# Patient Record
Sex: FEMALE | Race: BLACK OR AFRICAN AMERICAN | NOT HISPANIC OR LATINO | Employment: FULL TIME | URBAN - METROPOLITAN AREA
[De-identification: names, ages, dates, MRNs, and addresses within clinical notes are randomized per-mention and may not be internally consistent; named-entity substitution may affect disease eponyms.]

---

## 2023-03-09 DIAGNOSIS — I10 HYPERTENSION, UNSPECIFIED TYPE: Primary | ICD-10-CM

## 2023-03-09 DIAGNOSIS — E11.9 TYPE 2 DIABETES MELLITUS WITHOUT COMPLICATION, WITH LONG-TERM CURRENT USE OF INSULIN (MULTI): ICD-10-CM

## 2023-03-09 DIAGNOSIS — Z79.4 TYPE 2 DIABETES MELLITUS WITHOUT COMPLICATION, WITH LONG-TERM CURRENT USE OF INSULIN (MULTI): ICD-10-CM

## 2023-03-09 DIAGNOSIS — E78.5 HYPERLIPIDEMIA, UNSPECIFIED HYPERLIPIDEMIA TYPE: ICD-10-CM

## 2023-03-09 PROBLEM — K80.20 BILIARY STONE: Status: ACTIVE | Noted: 2023-03-09

## 2023-03-09 PROBLEM — K86.9 PANCREATIC DISEASE (HHS-HCC): Status: ACTIVE | Noted: 2023-03-09

## 2023-03-09 PROBLEM — R17 ELEVATED BILIRUBIN: Status: ACTIVE | Noted: 2023-03-09

## 2023-03-09 PROBLEM — R19.5 STOOL DISCOLORATION: Status: ACTIVE | Noted: 2023-03-09

## 2023-03-09 PROBLEM — R73.03 PREDIABETES: Status: ACTIVE | Noted: 2023-03-09

## 2023-03-09 PROBLEM — K30 INDIGESTION: Status: ACTIVE | Noted: 2023-03-09

## 2023-03-09 PROBLEM — R31.9 HEMATURIA: Status: ACTIVE | Noted: 2023-03-09

## 2023-03-09 PROBLEM — M16.9 OSTEOARTHRITIS OF HIP: Status: ACTIVE | Noted: 2023-03-09

## 2023-03-09 PROBLEM — M17.9 DJD (DEGENERATIVE JOINT DISEASE) OF KNEE: Status: ACTIVE | Noted: 2023-03-09

## 2023-03-09 PROBLEM — M19.049 OSTEOARTHRITIS OF FINGER: Status: ACTIVE | Noted: 2023-03-09

## 2023-03-09 RX ORDER — AMLODIPINE AND VALSARTAN 5; 160 MG/1; MG/1
1 TABLET ORAL DAILY
COMMUNITY
Start: 2022-02-25 | End: 2023-03-09 | Stop reason: SDUPTHER

## 2023-03-09 RX ORDER — METHYLPREDNISOLONE 4 MG/1
TABLET ORAL
COMMUNITY
Start: 2022-11-01 | End: 2023-06-22 | Stop reason: ALTCHOICE

## 2023-03-09 RX ORDER — ATORVASTATIN CALCIUM 20 MG/1
20 TABLET, FILM COATED ORAL DAILY
Qty: 90 TABLET | Refills: 0 | Status: SHIPPED | OUTPATIENT
Start: 2023-03-09 | End: 2024-01-02 | Stop reason: SDUPTHER

## 2023-03-09 RX ORDER — ALBUTEROL SULFATE 90 UG/1
2 AEROSOL, METERED RESPIRATORY (INHALATION) EVERY 6 HOURS PRN
COMMUNITY
Start: 2022-11-01 | End: 2023-12-29 | Stop reason: WASHOUT

## 2023-03-09 RX ORDER — DICLOFENAC SODIUM 30 MG/G
GEL TOPICAL
COMMUNITY
Start: 2022-02-25 | End: 2023-12-28 | Stop reason: ALTCHOICE

## 2023-03-09 RX ORDER — METFORMIN HYDROCHLORIDE 500 MG/1
500 TABLET, EXTENDED RELEASE ORAL DAILY
Qty: 90 TABLET | Refills: 0 | Status: SHIPPED | OUTPATIENT
Start: 2023-03-09 | End: 2024-01-02 | Stop reason: SDUPTHER

## 2023-03-09 RX ORDER — METFORMIN HYDROCHLORIDE 500 MG/1
1 TABLET, EXTENDED RELEASE ORAL DAILY
COMMUNITY
Start: 2021-10-06 | End: 2023-03-09 | Stop reason: SDUPTHER

## 2023-03-09 RX ORDER — ATORVASTATIN CALCIUM 20 MG/1
1 TABLET, FILM COATED ORAL DAILY
COMMUNITY
Start: 2022-02-25 | End: 2023-03-09 | Stop reason: SDUPTHER

## 2023-03-09 RX ORDER — AMLODIPINE AND VALSARTAN 5; 160 MG/1; MG/1
1 TABLET ORAL DAILY
Qty: 90 TABLET | Refills: 0 | Status: SHIPPED | OUTPATIENT
Start: 2023-03-09 | End: 2023-11-27 | Stop reason: SDUPTHER

## 2023-06-19 NOTE — PROGRESS NOTES
Subjective   Patient ID: Shagufta Crowley is a 62 y.o. female who presents for Sciatica (Sciatic pain ).  HPI   Sciatic  pain.  Since fall 5 years ago .  Hip  bursitis.   Had cortisone shots  would like  to go to ortho to  get the shots.   Oral meds not  helping.  Mortrin  naproxen salon pas patch  icey hot .      Review of Systems   Constitutional: Negative.    Musculoskeletal:  Positive for arthralgias, back pain and gait problem.   All other systems reviewed and are negative.      Objective   Physical Exam  Vitals and nursing note reviewed.   Constitutional:       Appearance: Normal appearance.   HENT:      Head: Normocephalic and atraumatic.      Nose: Nose normal.   Eyes:      Conjunctiva/sclera: Conjunctivae normal.   Cardiovascular:      Rate and Rhythm: Normal rate and regular rhythm.      Heart sounds: Normal heart sounds.   Pulmonary:      Effort: Pulmonary effort is normal.      Breath sounds: Normal breath sounds.   Musculoskeletal:         General: Tenderness present.   Skin:     General: Skin is dry.   Neurological:      General: No focal deficit present.      Mental Status: She is alert and oriented to person, place, and time. Mental status is at baseline.   Psychiatric:         Mood and Affect: Mood normal.         Behavior: Behavior normal.         Assessment/Plan   Problem List Items Addressed This Visit          Medium    DJD (degenerative joint disease) of knee    Relevant Medications    meloxicam (Mobic) 15 mg tablet    Osteoarthritis of hip - Primary    Relevant Medications    meloxicam (Mobic) 15 mg tablet    Other Relevant Orders    Referral to Orthopaedic Surgery    XR hip left 2 or 3 views    Elevated bilirubin    HLD (hyperlipidemia)    HTN (hypertension)    Indigestion    Pancreatic disease    Prediabetes     Other Visit Diagnoses       Screening for cardiovascular condition        Healthcare maintenance        Controlled type 2 diabetes mellitus without complication, unspecified  whether long term insulin use (CMS/[facility])        Encounter for screening mammogram for malignant neoplasm of breast        Relevant Orders    BI mammo bilateral screening tomosynthesis

## 2023-06-22 ENCOUNTER — OFFICE VISIT (OUTPATIENT)
Dept: PRIMARY CARE | Facility: CLINIC | Age: 63
End: 2023-06-22
Payer: COMMERCIAL

## 2023-06-22 VITALS
HEART RATE: 67 BPM | DIASTOLIC BLOOD PRESSURE: 84 MMHG | HEIGHT: 62 IN | OXYGEN SATURATION: 98 % | WEIGHT: 220.6 LBS | BODY MASS INDEX: 40.59 KG/M2 | SYSTOLIC BLOOD PRESSURE: 134 MMHG

## 2023-06-22 DIAGNOSIS — M17.9 OSTEOARTHRITIS OF KNEE, UNSPECIFIED LATERALITY, UNSPECIFIED OSTEOARTHRITIS TYPE: ICD-10-CM

## 2023-06-22 DIAGNOSIS — K86.9: ICD-10-CM

## 2023-06-22 DIAGNOSIS — R73.03 PREDIABETES: ICD-10-CM

## 2023-06-22 DIAGNOSIS — Z00.00 HEALTHCARE MAINTENANCE: ICD-10-CM

## 2023-06-22 DIAGNOSIS — R17 ELEVATED BILIRUBIN: ICD-10-CM

## 2023-06-22 DIAGNOSIS — Z13.6 SCREENING FOR CARDIOVASCULAR CONDITION: ICD-10-CM

## 2023-06-22 DIAGNOSIS — E78.5 HYPERLIPIDEMIA, UNSPECIFIED HYPERLIPIDEMIA TYPE: ICD-10-CM

## 2023-06-22 DIAGNOSIS — K30 INDIGESTION: ICD-10-CM

## 2023-06-22 DIAGNOSIS — Z12.31 ENCOUNTER FOR SCREENING MAMMOGRAM FOR MALIGNANT NEOPLASM OF BREAST: ICD-10-CM

## 2023-06-22 DIAGNOSIS — M16.52 POST-TRAUMATIC OSTEOARTHRITIS OF LEFT HIP: Primary | Chronic | ICD-10-CM

## 2023-06-22 DIAGNOSIS — E11.9 CONTROLLED TYPE 2 DIABETES MELLITUS WITHOUT COMPLICATION, UNSPECIFIED WHETHER LONG TERM INSULIN USE (MULTI): ICD-10-CM

## 2023-06-22 DIAGNOSIS — I10 HYPERTENSION, UNSPECIFIED TYPE: ICD-10-CM

## 2023-06-22 PROCEDURE — 3075F SYST BP GE 130 - 139MM HG: CPT | Performed by: INTERNAL MEDICINE

## 2023-06-22 PROCEDURE — 1036F TOBACCO NON-USER: CPT | Performed by: INTERNAL MEDICINE

## 2023-06-22 PROCEDURE — 99214 OFFICE O/P EST MOD 30 MIN: CPT | Performed by: INTERNAL MEDICINE

## 2023-06-22 PROCEDURE — 3079F DIAST BP 80-89 MM HG: CPT | Performed by: INTERNAL MEDICINE

## 2023-06-22 RX ORDER — MELOXICAM 15 MG/1
15 TABLET ORAL DAILY
Qty: 30 TABLET | Refills: 11 | Status: SHIPPED | OUTPATIENT
Start: 2023-06-22 | End: 2023-06-22 | Stop reason: SDUPTHER

## 2023-06-22 RX ORDER — MELOXICAM 15 MG/1
15 TABLET ORAL DAILY
Qty: 30 TABLET | Refills: 11 | Status: SHIPPED | OUTPATIENT
Start: 2023-06-22 | End: 2024-06-21

## 2023-06-22 ASSESSMENT — PATIENT HEALTH QUESTIONNAIRE - PHQ9
2. FEELING DOWN, DEPRESSED OR HOPELESS: NOT AT ALL
1. LITTLE INTEREST OR PLEASURE IN DOING THINGS: NOT AT ALL
SUM OF ALL RESPONSES TO PHQ9 QUESTIONS 1 AND 2: 0

## 2023-06-22 ASSESSMENT — LIFESTYLE VARIABLES
AUDIT-C TOTAL SCORE: 2
SKIP TO QUESTIONS 9-10: 0
HOW MANY STANDARD DRINKS CONTAINING ALCOHOL DO YOU HAVE ON A TYPICAL DAY: 1 OR 2
HOW OFTEN DO YOU HAVE SIX OR MORE DRINKS ON ONE OCCASION: LESS THAN MONTHLY
HOW OFTEN DO YOU HAVE A DRINK CONTAINING ALCOHOL: MONTHLY OR LESS

## 2023-06-22 ASSESSMENT — ENCOUNTER SYMPTOMS
ARTHRALGIAS: 1
BACK PAIN: 1
CONSTITUTIONAL NEGATIVE: 1

## 2023-11-27 DIAGNOSIS — I10 HYPERTENSION, UNSPECIFIED TYPE: ICD-10-CM

## 2023-11-27 DIAGNOSIS — E78.5 HYPERLIPIDEMIA, UNSPECIFIED HYPERLIPIDEMIA TYPE: ICD-10-CM

## 2023-11-27 DIAGNOSIS — Z79.4 TYPE 2 DIABETES MELLITUS WITHOUT COMPLICATION, WITH LONG-TERM CURRENT USE OF INSULIN (MULTI): ICD-10-CM

## 2023-11-27 DIAGNOSIS — E11.9 TYPE 2 DIABETES MELLITUS WITHOUT COMPLICATION, WITH LONG-TERM CURRENT USE OF INSULIN (MULTI): ICD-10-CM

## 2023-11-27 RX ORDER — AMLODIPINE AND VALSARTAN 5; 160 MG/1; MG/1
1 TABLET ORAL DAILY
Qty: 90 TABLET | Refills: 0 | Status: SHIPPED | OUTPATIENT
Start: 2023-11-27

## 2023-12-04 DIAGNOSIS — M16.9 OSTEOARTHROSIS, HIP: ICD-10-CM

## 2023-12-20 ENCOUNTER — TELEPHONE (OUTPATIENT)
Dept: PREADMISSION TESTING | Facility: HOSPITAL | Age: 63
End: 2023-12-20
Payer: COMMERCIAL

## 2023-12-28 ENCOUNTER — TELEMEDICINE CLINICAL SUPPORT (OUTPATIENT)
Dept: PREADMISSION TESTING | Facility: HOSPITAL | Age: 63
End: 2023-12-28
Payer: COMMERCIAL

## 2023-12-29 ENCOUNTER — LAB (OUTPATIENT)
Dept: LAB | Facility: LAB | Age: 63
End: 2023-12-29
Payer: COMMERCIAL

## 2023-12-29 ENCOUNTER — HOSPITAL ENCOUNTER (OUTPATIENT)
Dept: RADIOLOGY | Facility: HOSPITAL | Age: 63
Discharge: HOME | End: 2023-12-29
Payer: COMMERCIAL

## 2023-12-29 ENCOUNTER — HOSPITAL ENCOUNTER (OUTPATIENT)
Dept: CARDIOLOGY | Facility: HOSPITAL | Age: 63
Discharge: HOME | End: 2023-12-29
Payer: COMMERCIAL

## 2023-12-29 ENCOUNTER — PRE-ADMISSION TESTING (OUTPATIENT)
Dept: PREADMISSION TESTING | Facility: HOSPITAL | Age: 63
End: 2023-12-29
Payer: COMMERCIAL

## 2023-12-29 ENCOUNTER — OFFICE VISIT (OUTPATIENT)
Dept: ORTHOPEDIC SURGERY | Facility: CLINIC | Age: 63
End: 2023-12-29
Payer: COMMERCIAL

## 2023-12-29 VITALS
HEIGHT: 62 IN | TEMPERATURE: 98.3 F | BODY MASS INDEX: 39.76 KG/M2 | HEART RATE: 80 BPM | WEIGHT: 216.05 LBS | RESPIRATION RATE: 18 BRPM | OXYGEN SATURATION: 99 % | SYSTOLIC BLOOD PRESSURE: 168 MMHG | DIASTOLIC BLOOD PRESSURE: 91 MMHG

## 2023-12-29 DIAGNOSIS — Z01.818 PREPROCEDURAL EXAMINATION: ICD-10-CM

## 2023-12-29 DIAGNOSIS — I10 HYPERTENSION, UNSPECIFIED TYPE: ICD-10-CM

## 2023-12-29 DIAGNOSIS — E13.9 OTHER SPECIFIED DIABETES MELLITUS WITHOUT COMPLICATION, WITHOUT LONG-TERM CURRENT USE OF INSULIN (MULTI): ICD-10-CM

## 2023-12-29 DIAGNOSIS — M16.9 OSTEOARTHROSIS, HIP: Primary | ICD-10-CM

## 2023-12-29 DIAGNOSIS — I10 HYPERTENSION, UNSPECIFIED TYPE: Primary | ICD-10-CM

## 2023-12-29 LAB
ALBUMIN SERPL BCP-MCNC: 4.2 G/DL (ref 3.4–5)
ALP SERPL-CCNC: 77 U/L (ref 33–136)
ALT SERPL W P-5'-P-CCNC: 15 U/L (ref 7–45)
ANION GAP SERPL CALC-SCNC: 14 MMOL/L (ref 10–20)
AST SERPL W P-5'-P-CCNC: 13 U/L (ref 9–39)
BASOPHILS # BLD AUTO: 0.03 X10*3/UL (ref 0–0.1)
BASOPHILS NFR BLD AUTO: 0.4 %
BILIRUB SERPL-MCNC: 1.1 MG/DL (ref 0–1.2)
BUN SERPL-MCNC: 11 MG/DL (ref 6–23)
CALCIUM SERPL-MCNC: 9.4 MG/DL (ref 8.6–10.3)
CHLORIDE SERPL-SCNC: 102 MMOL/L (ref 98–107)
CO2 SERPL-SCNC: 24 MMOL/L (ref 21–32)
CREAT SERPL-MCNC: 0.76 MG/DL (ref 0.5–1.05)
EOSINOPHIL # BLD AUTO: 0.07 X10*3/UL (ref 0–0.7)
EOSINOPHIL NFR BLD AUTO: 1 %
ERYTHROCYTE [DISTWIDTH] IN BLOOD BY AUTOMATED COUNT: 11.9 % (ref 11.5–14.5)
EST. AVERAGE GLUCOSE BLD GHB EST-MCNC: 157 MG/DL
GFR SERPL CREATININE-BSD FRML MDRD: 89 ML/MIN/1.73M*2
GLUCOSE SERPL-MCNC: 158 MG/DL (ref 74–99)
HBA1C MFR BLD: 7.1 %
HCT VFR BLD AUTO: 42.1 % (ref 36–46)
HGB BLD-MCNC: 14.2 G/DL (ref 12–16)
IMM GRANULOCYTES # BLD AUTO: 0.02 X10*3/UL (ref 0–0.7)
IMM GRANULOCYTES NFR BLD AUTO: 0.3 % (ref 0–0.9)
LYMPHOCYTES # BLD AUTO: 2.27 X10*3/UL (ref 1.2–4.8)
LYMPHOCYTES NFR BLD AUTO: 32.9 %
MCH RBC QN AUTO: 30.5 PG (ref 26–34)
MCHC RBC AUTO-ENTMCNC: 33.7 G/DL (ref 32–36)
MCV RBC AUTO: 90 FL (ref 80–100)
MONOCYTES # BLD AUTO: 0.38 X10*3/UL (ref 0.1–1)
MONOCYTES NFR BLD AUTO: 5.5 %
NEUTROPHILS # BLD AUTO: 4.14 X10*3/UL (ref 1.2–7.7)
NEUTROPHILS NFR BLD AUTO: 59.9 %
NRBC BLD-RTO: 0 /100 WBCS (ref 0–0)
PLATELET # BLD AUTO: 240 X10*3/UL (ref 150–450)
POTASSIUM SERPL-SCNC: 3.8 MMOL/L (ref 3.5–5.3)
PROT SERPL-MCNC: 7.1 G/DL (ref 6.4–8.2)
RBC # BLD AUTO: 4.66 X10*6/UL (ref 4–5.2)
SODIUM SERPL-SCNC: 136 MMOL/L (ref 136–145)
WBC # BLD AUTO: 6.9 X10*3/UL (ref 4.4–11.3)

## 2023-12-29 PROCEDURE — 73502 X-RAY EXAM HIP UNI 2-3 VIEWS: CPT | Mod: LT

## 2023-12-29 PROCEDURE — 99213 OFFICE O/P EST LOW 20 MIN: CPT | Performed by: ORTHOPAEDIC SURGERY

## 2023-12-29 PROCEDURE — 73502 X-RAY EXAM HIP UNI 2-3 VIEWS: CPT | Mod: LEFT SIDE | Performed by: RADIOLOGY

## 2023-12-29 PROCEDURE — 85025 COMPLETE CBC W/AUTO DIFF WBC: CPT

## 2023-12-29 PROCEDURE — 87081 CULTURE SCREEN ONLY: CPT | Mod: AHULAB | Performed by: NURSE PRACTITIONER

## 2023-12-29 PROCEDURE — 36415 COLL VENOUS BLD VENIPUNCTURE: CPT

## 2023-12-29 PROCEDURE — 83036 HEMOGLOBIN GLYCOSYLATED A1C: CPT

## 2023-12-29 PROCEDURE — 1036F TOBACCO NON-USER: CPT | Performed by: ORTHOPAEDIC SURGERY

## 2023-12-29 PROCEDURE — 80053 COMPREHEN METABOLIC PANEL: CPT

## 2023-12-29 PROCEDURE — 93005 ELECTROCARDIOGRAM TRACING: CPT

## 2023-12-29 PROCEDURE — 99204 OFFICE O/P NEW MOD 45 MIN: CPT | Performed by: NURSE PRACTITIONER

## 2023-12-29 RX ORDER — CHLORHEXIDINE GLUCONATE ORAL RINSE 1.2 MG/ML
15 SOLUTION DENTAL DAILY
Qty: 30 ML | Refills: 0 | Status: SHIPPED | OUTPATIENT
Start: 2023-12-29 | End: 2024-01-04 | Stop reason: ALTCHOICE

## 2023-12-29 ASSESSMENT — ENCOUNTER SYMPTOMS
BRUISES/BLEEDS EASILY: 1
NECK NEGATIVE: 1
CARDIOVASCULAR NEGATIVE: 1
ARTHRALGIAS: 1
RESPIRATORY NEGATIVE: 1
GASTROINTESTINAL NEGATIVE: 1
CONSTITUTIONAL NEGATIVE: 1

## 2023-12-29 NOTE — H&P (VIEW-ONLY)
CPM/PAT Evaluation       Name: Shagufta Jeni Foster (Shagufta Jeni Foster)  /Age: 1960/63 y.o.     SURGEON :DR YIMI GUALLPA     Surgery, Date, and Length:  Left Hip Replacement Total Uncement Unilat DePuy Implants , 23    HPI:  This a 63 y.o. fe-male who presents for presurgical evaluation for for above mentioned procedure   . Pt states left hip pain for several years . PT did not alleviate or steroid injection .  After discussion of the risks and benefits with Dr GUALLPA . the patient elects to proceed with the planned procedure.       Past Medical History:   Diagnosis Date    Hyperlipidemia     Hypertension     Obese     Prediabetes        Past Surgical History:   Procedure Laterality Date    BREAST SURGERY Bilateral 2002    reduction    HYSTERECTOMY      THYROID SURGERY      has ad 3 surgeries between 5782-6482     Anesthesia History    PONV     Pt denies any past history of anesthetic complications such as  awareness, prolonged sedation, dental damage, aspiration, cardiac arrest, difficult intubation, difficult I.V. access or unexpected hospital admissions.  NO malignant hyperthermia and or pseudo cholinesterase deficiency.    The patient is not  a [employer] and will accept blood and blood products if medically indicated.   No history of blood transfusions .Type and screen not sent.    Social History  Social History     Substance and Sexual Activity   Drug Use Never      Social History     Substance and Sexual Activity   Alcohol Use Yes    Alcohol/week: 3.0 standard drinks of alcohol    Types: 3 Standard drinks or equivalent per week      Social History     Tobacco Use   Smoking Status Never   Smokeless Tobacco Never           Social History  Social History     Substance and Sexual Activity   Drug Use Never      Social History     Substance and Sexual Activity   Alcohol Use Yes    Alcohol/week: 3.0 standard drinks of alcohol    Types: 3 Standard drinks or equivalent per week       Social History     Tobacco Use   Smoking Status Never   Smokeless Tobacco Never      Is not     Family History   Problem Relation Name Age of Onset    Heart disease Mother      Diabetes Father         No Known Allergies    Prior to Admission medications    Medication Sig Start Date End Date Taking? Authorizing Provider   albuterol 90 mcg/actuation inhaler Inhale 2 puffs every 6 hours if needed for shortness of breath or wheezing. 11/1/22   Historical Provider, MD   amlodipine-valsartan (Exforge) 5-160 mg tablet Take 1 tablet by mouth once daily. 11/27/23   Alexis Salinas MD   atorvastatin (Lipitor) 20 mg tablet Take 1 tablet (20 mg) by mouth once daily. 3/9/23   Rosy De León MD   chlorhexidine (Peridex) 0.12 % solution Use 15 mL in the mouth or throat once daily for 2 days. 12/29/23 12/31/23  JAYRO Lei-CNP   meloxicam (Mobic) 15 mg tablet Take 1 tablet (15 mg) by mouth once daily. 6/22/23 6/21/24  Rosy De León MD   metFORMIN XR (Glucophage-XR) 500 mg 24 hr tablet Take 1 tablet (500 mg) by mouth once daily. 3/9/23   Rosy De León MD   diclofenac sodium 3 % gel apply a  peas size amount to affected area  bid 2/25/22 12/28/23  Historical Provider, MD CASTELLON ROS:   Constitutional:   neg    Neuro/Psych:   Eyes:   Ears:   Nose:   neg    Mouth:   neg    Throat:   neg    Neck:   neg    Cardio:   neg    Respiratory:   neg    Endocrine:   GI:   neg    :   neg    Musculoskeletal:    arthralgias  Hematologic:    bruises/bleeds easily  Skin:  neg        Physical Exam  Vitals reviewed.   Constitutional:       Appearance: Normal appearance.   HENT:      Head: Normocephalic and atraumatic.      Mouth/Throat:      Mouth: Mucous membranes are moist.   Eyes:      Extraocular Movements: Extraocular movements intact.      Pupils: Pupils are equal, round, and reactive to light.   Cardiovascular:      Rate and Rhythm: Normal rate and regular rhythm.   Pulmonary:      Effort: Pulmonary effort is normal.       "Breath sounds: Normal breath sounds.   Musculoskeletal:      Cervical back: Normal range of motion.   Skin:     General: Skin is warm.   Neurological:      Mental Status: She is alert and oriented to person, place, and time.   Psychiatric:         Mood and Affect: Mood normal.         Behavior: Behavior normal.          PAT AIRWAY:   Airway:     Mallampati::  II  normal      BP (!) 168/91   Pulse 80   Temp 36.8 °C (98.3 °F)   Resp 18   Ht 1.562 m (5' 1.5\")   Wt 98 kg (216 lb 0.8 oz)   SpO2 99%   BMI 40.16 kg/m²     Lab Results   Component Value Date    WBC 6.9 12/29/2023    HGB 14.2 12/29/2023    HCT 42.1 12/29/2023    MCV 90 12/29/2023     12/29/2023     Results from last 7 days   Lab Units 12/29/23  0938   SODIUM mmol/L 136   POTASSIUM mmol/L 3.8   CHLORIDE mmol/L 102   CO2 mmol/L 24   BUN mg/dL 11   CREATININE mg/dL 0.76   CALCIUM mg/dL 9.4   PROTEIN TOTAL g/dL 7.1   BILIRUBIN TOTAL mg/dL 1.1   ALK PHOS U/L 77   ALT U/L 15   AST U/L 13   GLUCOSE mg/dL 158*     Lab Results   Component Value Date    HGBA1C 7.1 (H) 12/29/2023      ASSESSMENT/PLAN    Patient is a 63 year-old  scheduled for Left Hip Replacement Total Uncement Unilat DePuy Implants  with Dr. Saleh  on  1/9/23 .  CARDIOVASCULAR:  RCRI score / Risk: The patients score is 0 based on history . Per ACC/AHA guidelines this places her  at  3.9% risk for MACE undergoing a intermediate  risk procedure . The patient has the following risk factors:  Functional Capacity: The patients exercise tolerance is  4  METS. This is based on the patients limited only due to hip pain . Patient denies  active cardiac symptoms or anginal equivalents .      PULMONARY:  The patient has the following factors that place them at increased risk of perioperative pulmonary complications;age greater than 60/BMI greater than 27//greater than 2.5 hour procedure.  Postoperatively the patient would benefit from early pulmonary toilet/incentive spirometry q 1-2 hours while " awake/pulse oximetry/cautious use of respiratory depressant medications such as opioids/elevate the HOB/oral hygiene.    PREDM:  The patient has elevated A!C  diabetes.Currently the patient manages their diabetes with oral agents, her  recent A1C was 7.1  on12/29/23.        DVT:  CAPRINI SCORE=10  The patient has the following factors that increase her  Risk for thrombus formation ; Virchow's triad , , age>60, bmi>39. TJRSurgical procedure >2 hrs  procedure .    Recommendations: DVT prophylaxis  per Dr. Saleh  protocol . SCD's, JHONATAN's, and early ambulation are recommended. Heparin or LMWH is recommended for the very high risk .      Risk assessment complete.  Patient is scheduled for  intermediate  surgical risk procedure.  Patient is considered an acceptable  risk to proceed with the planned procedure.      Preoperative medication instructions were provided and reviewed with the patient.  Any additional testing or evaluation was explained to the patient.  Nothing by mouth instructions were discussed and patient's questions were answered prior to conclusion to this encounter.  Patient verbalized understanding of preoperative instructions given in preadmission testing; discharge instructions available in EMR.

## 2023-12-29 NOTE — PROGRESS NOTES
Returns with some questions regarding scheduled left total hip soon.  Has questions about postop recovery.  Has questions about x-ray finding of spurs and how we deal with that.  Wants to discuss return to work after surgery and when she can return to driving.  All her questions were answered today.  Perioperative procedures were discussed in detail.

## 2023-12-29 NOTE — PREPROCEDURE INSTRUCTIONS
Medication List            Accurate as of December 29, 2023  8:20 AM. Always use your most recent med list.                albuterol 90 mcg/actuation inhaler  Medication Adjustments for Surgery: Take morning of surgery with sip of water, no other fluids     amlodipine-valsartan 5-160 mg tablet  Commonly known as: Exforge  Take 1 tablet by mouth once daily.  Medication Adjustments for Surgery: Continue until night before surgery     atorvastatin 20 mg tablet  Commonly known as: Lipitor  Take 1 tablet (20 mg) by mouth once daily.  Medication Adjustments for Surgery: Take morning of surgery with sip of water, no other fluids     chlorhexidine 0.12 % solution  Commonly known as: Peridex  Use 15 mL in the mouth or throat once daily for 2 days.  Notes to patient: Use as instructed      meloxicam 15 mg tablet  Commonly known as: Mobic  Take 1 tablet (15 mg) by mouth once daily.  Medication Adjustments for Surgery: Stop 7 days before surgery     metFORMIN  mg 24 hr tablet  Commonly known as: Glucophage-XR  Take 1 tablet (500 mg) by mouth once daily.  Medication Adjustments for Surgery: Stop 1 day before surgery                          CONTACT SURGEON'S OFFICE IF YOU DEVELOP:  * Fever = 100.4 F   * New respiratory symptoms (e.g. cough, shortness of breath, respiratory distress, sore throat)  * Recent loss of taste or smell  *Flu like symptoms such as headache, fatigue or gastrointestinal symptoms  * You develop any open sores, shingles, burning or painful urination   AND/OR:  * You no longer wish to have the surgery.  * Any other personal circumstances change that may lead to the need to cancel or defer this surgery.  *You were admitted to any hospital within one week of your planned procedure.    SMOKING:  *Quitting smoking can make a huge difference to your health and recovery from surgery.    *If you need help with quitting, call 8-421-QUIT-NOW.    THE DAY BEFORE SURGERY:  *Do not eat any food after midnight the  night before surgery.   *You are permitted to drink clear liquids (i.e. water, black coffee, tea, clear broth, apple juice) up to 2 hours before your surgery.  DIABETICS:  Please check fasting blood sugar  upon waking up.  If fasting sugar is <80 mg/dl, please drink 100ml/3oz of apple juice no later than 2 hours prior to surgery.      SURGICAL TIME  *You will be contacted between 2 p.m. and 6 p.m. the business day before your surgery with your arrival time.  *If you haven't received a call by 6pm, call [phone].  *Scheduled surgery times may change and you will be notified if this occurs-check your personal voicemail for any updates.    ON THE MORNING OF SURGERY:  *Wear comfortable, loose fitting clothing.   *Do not use moisturizers, creams, lotions or perfume.  *All jewelry and valuables should be left at home.  *Prosthetic devices such as contact lenses, hearing aids, dentures, eyelash extensions, hairpins and body piercing must be removed before surgery.    BRING WITH YOU:  *Photo ID and insurance card  *Current list of medicines and allergies  *Pacemaker/Defibrillator/Heart stent cards  *CPAP machine and mask  *Slings/splints/crutches  *Copy of your complete Advanced Directive/DHPOA-if applicable  *Neurostimulator implant remote    PARKING AND ARRIVAL:  *Check in at the Main Entrance desk and let them know you are here for surgery.  *You will be directed to the 2nd floor surgical waiting area.    AFTER OUTPATIENT SURGERY:  *A responsible adult MUST accompany you at the time of discharge and stay with you for 24 hours after your surgery.  *You may NOT drive yourself home after surgery.  *You may use a taxi or ride sharing service ([employer], [employer]) to return home ONLY if you are accompanied by a friend or family member.  *Instructions for resuming your medications will be provided by your surgeon.      YOU HAVE REVIEWED THE MEDICATIONS ON THIS SHEET AND YOU VERIFY THESE ARE ALL THE MEDICATIONS AND OVER THE COUNTER  MEDICATIONS THAT YOU TAKE .

## 2023-12-29 NOTE — CPM/PAT H&P
CPM/PAT Evaluation       Name: Shagufta Jeni Foster (Shagufta Jeni Foster)  /Age: 1960/63 y.o.     SURGEON :DR YIMI GUALLPA     Surgery, Date, and Length:  Left Hip Replacement Total Uncement Unilat DePuy Implants , 23    HPI:  This a 63 y.o. fe-male who presents for presurgical evaluation for for above mentioned procedure   . Pt states left hip pain for several years . PT did not alleviate or steroid injection .  After discussion of the risks and benefits with Dr GUALLPA . the patient elects to proceed with the planned procedure.       Past Medical History:   Diagnosis Date    Hyperlipidemia     Hypertension     Obese     Prediabetes        Past Surgical History:   Procedure Laterality Date    BREAST SURGERY Bilateral 2002    reduction    HYSTERECTOMY      THYROID SURGERY      has ad 3 surgeries between [id number]     Anesthesia History    PONV     Pt denies any past history of anesthetic complications such as  awareness, prolonged sedation, dental damage, aspiration, cardiac arrest, difficult intubation, difficult I.V. access or unexpected hospital admissions.  NO malignant hyperthermia and or pseudo cholinesterase deficiency.    The patient is not  a [employer] and will accept blood and blood products if medically indicated.   No history of blood transfusions .Type and screen not sent.    Social History  Social History     Substance and Sexual Activity   Drug Use Never      Social History     Substance and Sexual Activity   Alcohol Use Yes    Alcohol/week: 3.0 standard drinks of alcohol    Types: 3 Standard drinks or equivalent per week      Social History     Tobacco Use   Smoking Status Never   Smokeless Tobacco Never           Social History  Social History     Substance and Sexual Activity   Drug Use Never      Social History     Substance and Sexual Activity   Alcohol Use Yes    Alcohol/week: 3.0 standard drinks of alcohol    Types: 3 Standard drinks or equivalent per week       Social History     Tobacco Use   Smoking Status Never   Smokeless Tobacco Never      Is not     Family History   Problem Relation Name Age of Onset    Heart disease Mother      Diabetes Father         No Known Allergies    Prior to Admission medications    Medication Sig Start Date End Date Taking? Authorizing Provider   albuterol 90 mcg/actuation inhaler Inhale 2 puffs every 6 hours if needed for shortness of breath or wheezing. 11/1/22   Historical Provider, MD   amlodipine-valsartan (Exforge) 5-160 mg tablet Take 1 tablet by mouth once daily. 11/27/23   Alexis Salinas MD   atorvastatin (Lipitor) 20 mg tablet Take 1 tablet (20 mg) by mouth once daily. 3/9/23   Rosy De León MD   chlorhexidine (Peridex) 0.12 % solution Use 15 mL in the mouth or throat once daily for 2 days. 12/29/23 12/31/23  JAYRO Lei-CNP   meloxicam (Mobic) 15 mg tablet Take 1 tablet (15 mg) by mouth once daily. 6/22/23 6/21/24  Rosy De León MD   metFORMIN XR (Glucophage-XR) 500 mg 24 hr tablet Take 1 tablet (500 mg) by mouth once daily. 3/9/23   Rosy De León MD   diclofenac sodium 3 % gel apply a  peas size amount to affected area  bid 2/25/22 12/28/23  Historical Provider, MD CASTELLON ROS:   Constitutional:   neg    Neuro/Psych:   Eyes:   Ears:   Nose:   neg    Mouth:   neg    Throat:   neg    Neck:   neg    Cardio:   neg    Respiratory:   neg    Endocrine:   GI:   neg    :   neg    Musculoskeletal:    arthralgias  Hematologic:    bruises/bleeds easily  Skin:  neg        Physical Exam  Vitals reviewed.   Constitutional:       Appearance: Normal appearance.   HENT:      Head: Normocephalic and atraumatic.      Mouth/Throat:      Mouth: Mucous membranes are moist.   Eyes:      Extraocular Movements: Extraocular movements intact.      Pupils: Pupils are equal, round, and reactive to light.   Cardiovascular:      Rate and Rhythm: Normal rate and regular rhythm.   Pulmonary:      Effort: Pulmonary effort is normal.       "Breath sounds: Normal breath sounds.   Musculoskeletal:      Cervical back: Normal range of motion.   Skin:     General: Skin is warm.   Neurological:      Mental Status: She is alert and oriented to person, place, and time.   Psychiatric:         Mood and Affect: Mood normal.         Behavior: Behavior normal.          PAT AIRWAY:   Airway:     Mallampati::  II  normal      BP (!) 168/91   Pulse 80   Temp 36.8 °C (98.3 °F)   Resp 18   Ht 1.562 m (5' 1.5\")   Wt 98 kg (216 lb 0.8 oz)   SpO2 99%   BMI 40.16 kg/m²     Lab Results   Component Value Date    WBC 6.9 12/29/2023    HGB 14.2 12/29/2023    HCT 42.1 12/29/2023    MCV 90 12/29/2023     12/29/2023     Results from last 7 days   Lab Units 12/29/23  0938   SODIUM mmol/L 136   POTASSIUM mmol/L 3.8   CHLORIDE mmol/L 102   CO2 mmol/L 24   BUN mg/dL 11   CREATININE mg/dL 0.76   CALCIUM mg/dL 9.4   PROTEIN TOTAL g/dL 7.1   BILIRUBIN TOTAL mg/dL 1.1   ALK PHOS U/L 77   ALT U/L 15   AST U/L 13   GLUCOSE mg/dL 158*     Lab Results   Component Value Date    HGBA1C 7.1 (H) 12/29/2023      ASSESSMENT/PLAN    Patient is a 63 year-old  scheduled for Left Hip Replacement Total Uncement Unilat DePuy Implants  with Dr. Saleh  on  1/9/23 .  CARDIOVASCULAR:  RCRI score / Risk: The patients score is 0 based on history . Per ACC/AHA guidelines this places her  at  3.9% risk for MACE undergoing a intermediate  risk procedure . The patient has the following risk factors:  Functional Capacity: The patients exercise tolerance is  4  METS. This is based on the patients limited only due to hip pain . Patient denies  active cardiac symptoms or anginal equivalents .      PULMONARY:  The patient has the following factors that place them at increased risk of perioperative pulmonary complications;age greater than 60/BMI greater than 27//greater than 2.5 hour procedure.  Postoperatively the patient would benefit from early pulmonary toilet/incentive spirometry q 1-2 hours while " awake/pulse oximetry/cautious use of respiratory depressant medications such as opioids/elevate the HOB/oral hygiene.    PREDM:  The patient has elevated A!C  diabetes.Currently the patient manages their diabetes with oral agents, her  recent A1C was 7.1  on12/29/23.        DVT:  CAPRINI SCORE=10  The patient has the following factors that increase her  Risk for thrombus formation ; Virchow's triad , , age>60, bmi>39. TJRSurgical procedure >2 hrs  procedure .    Recommendations: DVT prophylaxis  per Dr. Saleh  protocol . SCD's, JHONATAN's, and early ambulation are recommended. Heparin or LMWH is recommended for the very high risk .      Risk assessment complete.  Patient is scheduled for  intermediate  surgical risk procedure.  Patient is considered an acceptable  risk to proceed with the planned procedure.      Preoperative medication instructions were provided and reviewed with the patient.  Any additional testing or evaluation was explained to the patient.  Nothing by mouth instructions were discussed and patient's questions were answered prior to conclusion to this encounter.  Patient verbalized understanding of preoperative instructions given in preadmission testing; discharge instructions available in EMR.

## 2023-12-30 LAB
ATRIAL RATE: 71 BPM
P AXIS: 56 DEGREES
P OFFSET: 177 MS
P ONSET: 120 MS
PR INTERVAL: 200 MS
Q ONSET: 220 MS
QRS COUNT: 11 BEATS
QRS DURATION: 88 MS
QT INTERVAL: 386 MS
QTC CALCULATION(BAZETT): 419 MS
QTC FREDERICIA: 408 MS
R AXIS: 10 DEGREES
T AXIS: 29 DEGREES
T OFFSET: 413 MS
VENTRICULAR RATE: 71 BPM

## 2023-12-31 LAB — STAPHYLOCOCCUS SPEC CULT: ABNORMAL

## 2024-01-02 ENCOUNTER — OFFICE VISIT (OUTPATIENT)
Dept: PRIMARY CARE | Facility: CLINIC | Age: 64
End: 2024-01-02
Payer: COMMERCIAL

## 2024-01-02 VITALS
TEMPERATURE: 97.7 F | DIASTOLIC BLOOD PRESSURE: 88 MMHG | HEART RATE: 90 BPM | SYSTOLIC BLOOD PRESSURE: 162 MMHG | OXYGEN SATURATION: 98 % | WEIGHT: 217.2 LBS | HEIGHT: 62 IN | BODY MASS INDEX: 39.97 KG/M2

## 2024-01-02 DIAGNOSIS — I10 HYPERTENSION, UNSPECIFIED TYPE: ICD-10-CM

## 2024-01-02 DIAGNOSIS — Z79.4 TYPE 2 DIABETES MELLITUS WITHOUT COMPLICATION, WITH LONG-TERM CURRENT USE OF INSULIN (MULTI): ICD-10-CM

## 2024-01-02 DIAGNOSIS — E11.9 TYPE 2 DIABETES MELLITUS WITHOUT COMPLICATION, WITH LONG-TERM CURRENT USE OF INSULIN (MULTI): ICD-10-CM

## 2024-01-02 DIAGNOSIS — Z01.818 PREOP EXAMINATION: Primary | ICD-10-CM

## 2024-01-02 DIAGNOSIS — F41.9 ANXIETY: ICD-10-CM

## 2024-01-02 DIAGNOSIS — E78.5 HYPERLIPIDEMIA, UNSPECIFIED HYPERLIPIDEMIA TYPE: ICD-10-CM

## 2024-01-02 PROCEDURE — 99213 OFFICE O/P EST LOW 20 MIN: CPT | Performed by: INTERNAL MEDICINE

## 2024-01-02 PROCEDURE — 3079F DIAST BP 80-89 MM HG: CPT | Performed by: INTERNAL MEDICINE

## 2024-01-02 PROCEDURE — 1036F TOBACCO NON-USER: CPT | Performed by: INTERNAL MEDICINE

## 2024-01-02 PROCEDURE — 3077F SYST BP >= 140 MM HG: CPT | Performed by: INTERNAL MEDICINE

## 2024-01-02 RX ORDER — ATORVASTATIN CALCIUM 20 MG/1
20 TABLET, FILM COATED ORAL DAILY
Qty: 90 TABLET | Refills: 0 | Status: SHIPPED | OUTPATIENT
Start: 2024-01-02

## 2024-01-02 RX ORDER — METFORMIN HYDROCHLORIDE 500 MG/1
500 TABLET, EXTENDED RELEASE ORAL DAILY
Qty: 90 TABLET | Refills: 0 | Status: SHIPPED | OUTPATIENT
Start: 2024-01-02

## 2024-01-02 ASSESSMENT — PATIENT HEALTH QUESTIONNAIRE - PHQ9
SUM OF ALL RESPONSES TO PHQ9 QUESTIONS 1 AND 2: 0
2. FEELING DOWN, DEPRESSED OR HOPELESS: NOT AT ALL
1. LITTLE INTEREST OR PLEASURE IN DOING THINGS: NOT AT ALL

## 2024-01-02 ASSESSMENT — COLUMBIA-SUICIDE SEVERITY RATING SCALE - C-SSRS
2. HAVE YOU ACTUALLY HAD ANY THOUGHTS OF KILLING YOURSELF?: NO
1. IN THE PAST MONTH, HAVE YOU WISHED YOU WERE DEAD OR WISHED YOU COULD GO TO SLEEP AND NOT WAKE UP?: NO
6. HAVE YOU EVER DONE ANYTHING, STARTED TO DO ANYTHING, OR PREPARED TO DO ANYTHING TO END YOUR LIFE?: NO

## 2024-01-02 ASSESSMENT — ENCOUNTER SYMPTOMS
OCCASIONAL FEELINGS OF UNSTEADINESS: 0
DEPRESSION: 0
LOSS OF SENSATION IN FEET: 0

## 2024-01-02 NOTE — PROGRESS NOTES
"Subjective   Patient ID: Shagufta Crowley is a 63 y.o. female who presents for Pre-op Clearance and Medication Problem (BP medication ).    HPI patient presents to clinic for preop clearance regarding left hip surgery scheduled for next week with Dr. Saleh .she is doing well otherwise and has good functional status.  She is little bit anxious and nervous about this coming surgery.  She denies any chest pain, cough congestion shortness of breath swelling of legs fever chills and headache.  She already had preop evaluation done last week.  Laboratory studies done showed hemoglobin of 14.2, Leukos of 158 potassium of 3.8, hemoglobin A1c of 7.1% and other studies were unremarkable.  Her initial blood pressure was 162/88 and after 5 minutes of rest it came down to 140/80.   EKG done on 12/29/2023 showed sinus rhythm at 71 bpm with possible left atrial enlargement poor R wave progression without any ischemic changes.     Past medical history significant for arthritis of hips, hypertension, hyperlipidemia, DJD of knees, prediabetes and hips,  Past surgical history notable for hysterectomy secondary to fibroid of the uterus, status post bilateral breast reduction, cholecystectomy, and ERCP with biliary stone removal on 11/22/2022.    Review of Systems   Constitutional: Negative.    HENT: Negative.     Eyes: Negative.    Respiratory: Negative.     Cardiovascular: Negative.    Gastrointestinal: Negative.    Endocrine: Negative.    Genitourinary: Negative.    Musculoskeletal:  Positive for arthralgias.   Skin: Negative.    Allergic/Immunologic: Negative.    Neurological: Negative.    Hematological: Negative.    Psychiatric/Behavioral: Negative.         Objective   /88   Pulse 90   Temp 36.5 °C (97.7 °F)   Ht 1.562 m (5' 1.5\")   Wt 98.5 kg (217 lb 3.2 oz)   SpO2 98%   BMI 40.37 kg/m²     Physical Exam  Constitutional:       Appearance: Normal appearance. She is obese.   HENT:      Right Ear: Tympanic membrane " normal.      Left Ear: Tympanic membrane and ear canal normal.      Nose: Nose normal.   Neck:      Vascular: No carotid bruit.   Cardiovascular:      Rate and Rhythm: Normal rate.   Pulmonary:      Effort: No respiratory distress.      Breath sounds: No stridor. No wheezing.   Abdominal:      Palpations: Abdomen is soft.      Tenderness: There is no guarding or rebound.   Skin:     Coloration: Skin is not jaundiced.   Neurological:      General: No focal deficit present.      Mental Status: She is alert and oriented to person, place, and time.   Psychiatric:         Mood and Affect: Mood normal.         Assessment/Plan    patient is medically stable and is at low risk for surgery.  She is advised to hold NSAIDs and aspirin 1 week prior to surgery.  She is also advised to hold metformin on day of surgery.  She will continue other medication perioperatively and postoperatively.  She will also benefit from incentive spirometry to prevent postop atelectasis and/or pneumonia.  She will also need DVT prophylaxis  with any of the anticoagulant including Xarelto, Coumadin, Lovenox or Eliquis for 35 days postoperatively.

## 2024-01-02 NOTE — PROGRESS NOTES
"Subjective   Patient ID: Shagufta Crowley is a 63 y.o. female who presents for Pre-op Clearance and Medication Problem (BP medication ).    HPI     Review of Systems    Objective   /88   Pulse 90   Temp 36.5 °C (97.7 °F)   Ht 1.562 m (5' 1.5\")   Wt 98.5 kg (217 lb 3.2 oz)   SpO2 98%   BMI 40.37 kg/m²     Physical Exam    Assessment/Plan          "

## 2024-01-04 ASSESSMENT — ENCOUNTER SYMPTOMS
HEMATOLOGIC/LYMPHATIC NEGATIVE: 1
ARTHRALGIAS: 1
GASTROINTESTINAL NEGATIVE: 1
RESPIRATORY NEGATIVE: 1
ALLERGIC/IMMUNOLOGIC NEGATIVE: 1
CONSTITUTIONAL NEGATIVE: 1
NEUROLOGICAL NEGATIVE: 1
ENDOCRINE NEGATIVE: 1
EYES NEGATIVE: 1
CARDIOVASCULAR NEGATIVE: 1
PSYCHIATRIC NEGATIVE: 1

## 2024-01-08 DIAGNOSIS — Z01.818 PREOP EXAMINATION: Primary | ICD-10-CM

## 2024-01-08 RX ORDER — CHLORHEXIDINE GLUCONATE ORAL RINSE 1.2 MG/ML
15 SOLUTION DENTAL 2 TIMES DAILY
Qty: 473 ML | Refills: 0 | Status: SHIPPED | OUTPATIENT
Start: 2024-01-08 | End: 2024-01-10 | Stop reason: HOSPADM

## 2024-01-08 NOTE — PROGRESS NOTES
1/8/24 1503  Call placed to patient to discuss discharge planning for after surgery.  Message left.  Keisha Snell RN TCC

## 2024-01-09 ENCOUNTER — HOSPITAL ENCOUNTER (OUTPATIENT)
Facility: HOSPITAL | Age: 64
Setting detail: OBSERVATION
Discharge: HOME | DRG: 470 | End: 2024-01-10
Attending: ORTHOPAEDIC SURGERY | Admitting: ORTHOPAEDIC SURGERY
Payer: COMMERCIAL

## 2024-01-09 ENCOUNTER — ANESTHESIA (OUTPATIENT)
Dept: OPERATING ROOM | Facility: HOSPITAL | Age: 64
DRG: 470 | End: 2024-01-09
Payer: COMMERCIAL

## 2024-01-09 ENCOUNTER — ANESTHESIA EVENT (OUTPATIENT)
Dept: OPERATING ROOM | Facility: HOSPITAL | Age: 64
DRG: 470 | End: 2024-01-09
Payer: COMMERCIAL

## 2024-01-09 ENCOUNTER — DOCUMENTATION (OUTPATIENT)
Dept: HOME HEALTH SERVICES | Facility: HOME HEALTH | Age: 64
End: 2024-01-09

## 2024-01-09 ENCOUNTER — APPOINTMENT (OUTPATIENT)
Dept: RADIOLOGY | Facility: HOSPITAL | Age: 64
DRG: 470 | End: 2024-01-09
Payer: COMMERCIAL

## 2024-01-09 ENCOUNTER — HOME HEALTH ADMISSION (OUTPATIENT)
Dept: HOME HEALTH SERVICES | Facility: HOME HEALTH | Age: 64
End: 2024-01-09
Payer: COMMERCIAL

## 2024-01-09 DIAGNOSIS — M16.9 OSTEOARTHROSIS, HIP: Primary | ICD-10-CM

## 2024-01-09 PROBLEM — M16.10 ARTHRITIS, HIP: Status: ACTIVE | Noted: 2024-01-09

## 2024-01-09 PROCEDURE — 72170 X-RAY EXAM OF PELVIS: CPT | Mod: FOREIGN READ | Performed by: RADIOLOGY

## 2024-01-09 PROCEDURE — G0378 HOSPITAL OBSERVATION PER HR: HCPCS

## 2024-01-09 PROCEDURE — 72170 X-RAY EXAM OF PELVIS: CPT | Mod: FR

## 2024-01-09 PROCEDURE — A4217 STERILE WATER/SALINE, 500 ML: HCPCS | Performed by: ORTHOPAEDIC SURGERY

## 2024-01-09 PROCEDURE — 7100000011 HC EXTENDED STAY RECOVERY HOURLY - NURSING UNIT

## 2024-01-09 PROCEDURE — C1776 JOINT DEVICE (IMPLANTABLE): HCPCS | Performed by: ORTHOPAEDIC SURGERY

## 2024-01-09 PROCEDURE — 3700000002 HC GENERAL ANESTHESIA TIME - EACH INCREMENTAL 1 MINUTE: Performed by: ORTHOPAEDIC SURGERY

## 2024-01-09 PROCEDURE — A27130 PR TOTAL HIP ARTHROPLASTY: Performed by: NURSE ANESTHETIST, CERTIFIED REGISTERED

## 2024-01-09 PROCEDURE — 2500000005 HC RX 250 GENERAL PHARMACY W/O HCPCS: Performed by: ORTHOPAEDIC SURGERY

## 2024-01-09 PROCEDURE — 97530 THERAPEUTIC ACTIVITIES: CPT | Mod: GP

## 2024-01-09 PROCEDURE — 2500000001 HC RX 250 WO HCPCS SELF ADMINISTERED DRUGS (ALT 637 FOR MEDICARE OP): Performed by: ORTHOPAEDIC SURGERY

## 2024-01-09 PROCEDURE — 1100000001 HC PRIVATE ROOM DAILY

## 2024-01-09 PROCEDURE — 2780000003 HC OR 278 NO HCPCS: Performed by: ORTHOPAEDIC SURGERY

## 2024-01-09 PROCEDURE — 2500000005 HC RX 250 GENERAL PHARMACY W/O HCPCS: Performed by: NURSE ANESTHETIST, CERTIFIED REGISTERED

## 2024-01-09 PROCEDURE — A27130 PR TOTAL HIP ARTHROPLASTY: Performed by: STUDENT IN AN ORGANIZED HEALTH CARE EDUCATION/TRAINING PROGRAM

## 2024-01-09 PROCEDURE — 7100000002 HC RECOVERY ROOM TIME - EACH INCREMENTAL 1 MINUTE: Performed by: ORTHOPAEDIC SURGERY

## 2024-01-09 PROCEDURE — 3600000010 HC OR TIME - EACH INCREMENTAL 1 MINUTE - PROCEDURE LEVEL FIVE: Performed by: ORTHOPAEDIC SURGERY

## 2024-01-09 PROCEDURE — 2500000004 HC RX 250 GENERAL PHARMACY W/ HCPCS (ALT 636 FOR OP/ED): Performed by: NURSE ANESTHETIST, CERTIFIED REGISTERED

## 2024-01-09 PROCEDURE — A6213 FOAM DRG >16<=48 SQ IN W/BDR: HCPCS | Performed by: ORTHOPAEDIC SURGERY

## 2024-01-09 PROCEDURE — 2500000004 HC RX 250 GENERAL PHARMACY W/ HCPCS (ALT 636 FOR OP/ED): Performed by: ORTHOPAEDIC SURGERY

## 2024-01-09 PROCEDURE — 27130 TOTAL HIP ARTHROPLASTY: CPT | Performed by: ORTHOPAEDIC SURGERY

## 2024-01-09 PROCEDURE — 97110 THERAPEUTIC EXERCISES: CPT | Mod: GP

## 2024-01-09 PROCEDURE — 3600000005 HC OR TIME - INITIAL BASE CHARGE - PROCEDURE LEVEL FIVE: Performed by: ORTHOPAEDIC SURGERY

## 2024-01-09 PROCEDURE — 3700000001 HC GENERAL ANESTHESIA TIME - INITIAL BASE CHARGE: Performed by: ORTHOPAEDIC SURGERY

## 2024-01-09 PROCEDURE — RXMED WILLOW AMBULATORY MEDICATION CHARGE

## 2024-01-09 PROCEDURE — 2720000007 HC OR 272 NO HCPCS: Performed by: ORTHOPAEDIC SURGERY

## 2024-01-09 PROCEDURE — 7100000001 HC RECOVERY ROOM TIME - INITIAL BASE CHARGE: Performed by: ORTHOPAEDIC SURGERY

## 2024-01-09 PROCEDURE — 97161 PT EVAL LOW COMPLEX 20 MIN: CPT | Mod: GP

## 2024-01-09 PROCEDURE — C1713 ANCHOR/SCREW BN/BN,TIS/BN: HCPCS | Performed by: ORTHOPAEDIC SURGERY

## 2024-01-09 DEVICE — IMPLANTABLE DEVICE: Type: IMPLANTABLE DEVICE | Site: HIP | Status: FUNCTIONAL

## 2024-01-09 DEVICE — FEMORAL HEAD, CERAMIC 36 +5: Type: IMPLANTABLE DEVICE | Site: HIP | Status: FUNCTIONAL

## 2024-01-09 DEVICE — SCREW CANCELLOUS 6.5 X 20: Type: IMPLANTABLE DEVICE | Site: HIP | Status: FUNCTIONAL

## 2024-01-09 DEVICE — PINNACLE GRIPTION ACETABULAR SHELL SECTOR 52MM OD
Type: IMPLANTABLE DEVICE | Site: HIP | Status: FUNCTIONAL
Brand: PINNACLE GRIPTION

## 2024-01-09 RX ORDER — OXYCODONE HYDROCHLORIDE 5 MG/1
5 TABLET ORAL EVERY 6 HOURS PRN
Status: DISCONTINUED | OUTPATIENT
Start: 2024-01-09 | End: 2024-01-10 | Stop reason: HOSPADM

## 2024-01-09 RX ORDER — ACETAMINOPHEN 325 MG/1
975 TABLET ORAL ONCE
Status: COMPLETED | OUTPATIENT
Start: 2024-01-09 | End: 2024-01-09

## 2024-01-09 RX ORDER — ALBUTEROL SULFATE 0.83 MG/ML
2.5 SOLUTION RESPIRATORY (INHALATION) ONCE AS NEEDED
Status: DISCONTINUED | OUTPATIENT
Start: 2024-01-09 | End: 2024-01-09 | Stop reason: HOSPADM

## 2024-01-09 RX ORDER — OXYCODONE HYDROCHLORIDE 5 MG/1
5 TABLET ORAL EVERY 4 HOURS PRN
Status: DISCONTINUED | OUTPATIENT
Start: 2024-01-09 | End: 2024-01-09 | Stop reason: HOSPADM

## 2024-01-09 RX ORDER — CEFAZOLIN SODIUM 2 G/100ML
2 INJECTION, SOLUTION INTRAVENOUS EVERY 6 HOURS
Status: COMPLETED | OUTPATIENT
Start: 2024-01-09 | End: 2024-01-10

## 2024-01-09 RX ORDER — ACETAMINOPHEN 325 MG/1
650 TABLET ORAL EVERY 6 HOURS SCHEDULED
Status: DISCONTINUED | OUTPATIENT
Start: 2024-01-09 | End: 2024-01-10 | Stop reason: HOSPADM

## 2024-01-09 RX ORDER — ASPIRIN 81 MG/1
81 TABLET ORAL 2 TIMES DAILY
Status: DISCONTINUED | OUTPATIENT
Start: 2024-01-09 | End: 2024-01-10 | Stop reason: HOSPADM

## 2024-01-09 RX ORDER — CEFAZOLIN 1 G/1
INJECTION, POWDER, FOR SOLUTION INTRAVENOUS AS NEEDED
Status: DISCONTINUED | OUTPATIENT
Start: 2024-01-09 | End: 2024-01-09

## 2024-01-09 RX ORDER — ONDANSETRON HYDROCHLORIDE 2 MG/ML
4 INJECTION, SOLUTION INTRAVENOUS ONCE AS NEEDED
Status: DISCONTINUED | OUTPATIENT
Start: 2024-01-09 | End: 2024-01-09 | Stop reason: HOSPADM

## 2024-01-09 RX ORDER — METFORMIN HYDROCHLORIDE 500 MG/1
500 TABLET, EXTENDED RELEASE ORAL DAILY
Status: DISCONTINUED | OUTPATIENT
Start: 2024-01-10 | End: 2024-01-10 | Stop reason: HOSPADM

## 2024-01-09 RX ORDER — NAPROXEN SODIUM 220 MG/1
81 TABLET, FILM COATED ORAL 2 TIMES DAILY
Qty: 28 TABLET | Refills: 0 | Status: SHIPPED | OUTPATIENT
Start: 2024-01-09 | End: 2024-01-24

## 2024-01-09 RX ORDER — OXYCODONE HYDROCHLORIDE 5 MG/1
10 TABLET ORAL EVERY 4 HOURS PRN
Status: DISCONTINUED | OUTPATIENT
Start: 2024-01-09 | End: 2024-01-09 | Stop reason: HOSPADM

## 2024-01-09 RX ORDER — SODIUM CHLORIDE 0.9 G/100ML
IRRIGANT IRRIGATION AS NEEDED
Status: DISCONTINUED | OUTPATIENT
Start: 2024-01-09 | End: 2024-01-09 | Stop reason: HOSPADM

## 2024-01-09 RX ORDER — POLYETHYLENE GLYCOL 3350 17 G/17G
17 POWDER, FOR SOLUTION ORAL DAILY
Status: DISCONTINUED | OUTPATIENT
Start: 2024-01-09 | End: 2024-01-10 | Stop reason: HOSPADM

## 2024-01-09 RX ORDER — ONDANSETRON HYDROCHLORIDE 2 MG/ML
INJECTION, SOLUTION INTRAVENOUS AS NEEDED
Status: DISCONTINUED | OUTPATIENT
Start: 2024-01-09 | End: 2024-01-09

## 2024-01-09 RX ORDER — SODIUM CHLORIDE, SODIUM LACTATE, POTASSIUM CHLORIDE, CALCIUM CHLORIDE 600; 310; 30; 20 MG/100ML; MG/100ML; MG/100ML; MG/100ML
INJECTION, SOLUTION INTRAVENOUS CONTINUOUS PRN
Status: DISCONTINUED | OUTPATIENT
Start: 2024-01-09 | End: 2024-01-09

## 2024-01-09 RX ORDER — WATER 1 ML/ML
IRRIGANT IRRIGATION AS NEEDED
Status: DISCONTINUED | OUTPATIENT
Start: 2024-01-09 | End: 2024-01-09 | Stop reason: HOSPADM

## 2024-01-09 RX ORDER — TRANEXAMIC ACID 650 MG/1
1950 TABLET ORAL ONCE
Status: COMPLETED | OUTPATIENT
Start: 2024-01-09 | End: 2024-01-09

## 2024-01-09 RX ORDER — OXYCODONE HYDROCHLORIDE 5 MG/1
10 TABLET ORAL EVERY 4 HOURS PRN
Status: DISCONTINUED | OUTPATIENT
Start: 2024-01-09 | End: 2024-01-10 | Stop reason: HOSPADM

## 2024-01-09 RX ORDER — TRANEXAMIC ACID 650 MG/1
1950 TABLET ORAL ONCE
Status: DISCONTINUED | OUTPATIENT
Start: 2024-01-10 | End: 2024-01-10 | Stop reason: HOSPADM

## 2024-01-09 RX ORDER — OXYCODONE HYDROCHLORIDE 5 MG/1
5 TABLET ORAL EVERY 6 HOURS PRN
Qty: 28 TABLET | Refills: 0 | Status: SHIPPED | OUTPATIENT
Start: 2024-01-09 | End: 2024-01-17

## 2024-01-09 RX ORDER — PROPOFOL 10 MG/ML
INJECTION, EMULSION INTRAVENOUS AS NEEDED
Status: DISCONTINUED | OUTPATIENT
Start: 2024-01-09 | End: 2024-01-09

## 2024-01-09 RX ORDER — VALSARTAN 160 MG/1
160 TABLET ORAL DAILY
Status: DISCONTINUED | OUTPATIENT
Start: 2024-01-10 | End: 2024-01-10 | Stop reason: HOSPADM

## 2024-01-09 RX ORDER — LIDOCAINE HYDROCHLORIDE 10 MG/ML
0.1 INJECTION, SOLUTION EPIDURAL; INFILTRATION; INTRACAUDAL; PERINEURAL ONCE
Status: DISCONTINUED | OUTPATIENT
Start: 2024-01-09 | End: 2024-01-09 | Stop reason: HOSPADM

## 2024-01-09 RX ORDER — HYDRALAZINE HYDROCHLORIDE 20 MG/ML
5 INJECTION INTRAMUSCULAR; INTRAVENOUS EVERY 30 MIN PRN
Status: DISCONTINUED | OUTPATIENT
Start: 2024-01-09 | End: 2024-01-09 | Stop reason: HOSPADM

## 2024-01-09 RX ORDER — MIDAZOLAM HYDROCHLORIDE 1 MG/ML
INJECTION, SOLUTION INTRAMUSCULAR; INTRAVENOUS AS NEEDED
Status: DISCONTINUED | OUTPATIENT
Start: 2024-01-09 | End: 2024-01-09

## 2024-01-09 RX ORDER — DOCUSATE SODIUM 100 MG/1
100 CAPSULE, LIQUID FILLED ORAL 2 TIMES DAILY
Qty: 28 CAPSULE | Refills: 0 | Status: SHIPPED | OUTPATIENT
Start: 2024-01-09 | End: 2024-01-24

## 2024-01-09 RX ORDER — NALOXONE HYDROCHLORIDE 1 MG/ML
0.2 INJECTION INTRAMUSCULAR; INTRAVENOUS; SUBCUTANEOUS EVERY 5 MIN PRN
Status: DISCONTINUED | OUTPATIENT
Start: 2024-01-09 | End: 2024-01-10 | Stop reason: HOSPADM

## 2024-01-09 RX ORDER — ACETAMINOPHEN 325 MG/1
650 TABLET ORAL EVERY 4 HOURS PRN
Status: DISCONTINUED | OUTPATIENT
Start: 2024-01-09 | End: 2024-01-09 | Stop reason: HOSPADM

## 2024-01-09 RX ORDER — LIDOCAINE HYDROCHLORIDE 20 MG/ML
INJECTION, SOLUTION EPIDURAL; INFILTRATION; INTRACAUDAL; PERINEURAL AS NEEDED
Status: DISCONTINUED | OUTPATIENT
Start: 2024-01-09 | End: 2024-01-09

## 2024-01-09 RX ORDER — SODIUM CHLORIDE, SODIUM LACTATE, POTASSIUM CHLORIDE, CALCIUM CHLORIDE 600; 310; 30; 20 MG/100ML; MG/100ML; MG/100ML; MG/100ML
100 INJECTION, SOLUTION INTRAVENOUS CONTINUOUS
Status: DISCONTINUED | OUTPATIENT
Start: 2024-01-09 | End: 2024-01-09 | Stop reason: HOSPADM

## 2024-01-09 RX ORDER — AMLODIPINE AND VALSARTAN 5; 160 MG/1; MG/1
1 TABLET ORAL DAILY
Status: DISCONTINUED | OUTPATIENT
Start: 2024-01-09 | End: 2024-01-09 | Stop reason: CLARIF

## 2024-01-09 RX ORDER — PHENYLEPHRINE HCL IN 0.9% NACL 1 MG/10 ML
SYRINGE (ML) INTRAVENOUS AS NEEDED
Status: DISCONTINUED | OUTPATIENT
Start: 2024-01-09 | End: 2024-01-09

## 2024-01-09 RX ORDER — PROPOFOL 10 MG/ML
INJECTION, EMULSION INTRAVENOUS CONTINUOUS PRN
Status: DISCONTINUED | OUTPATIENT
Start: 2024-01-09 | End: 2024-01-09

## 2024-01-09 RX ORDER — MELOXICAM 7.5 MG/1
15 TABLET ORAL DAILY
Status: DISCONTINUED | OUTPATIENT
Start: 2024-01-10 | End: 2024-01-10 | Stop reason: HOSPADM

## 2024-01-09 RX ORDER — LABETALOL HYDROCHLORIDE 5 MG/ML
5 INJECTION, SOLUTION INTRAVENOUS ONCE AS NEEDED
Status: DISCONTINUED | OUTPATIENT
Start: 2024-01-09 | End: 2024-01-09 | Stop reason: HOSPADM

## 2024-01-09 RX ORDER — TRAMADOL HYDROCHLORIDE 50 MG/1
100 TABLET ORAL EVERY 6 HOURS PRN
Qty: 28 TABLET | Refills: 0 | Status: SHIPPED | OUTPATIENT
Start: 2024-01-09

## 2024-01-09 RX ORDER — SODIUM CHLORIDE, SODIUM LACTATE, POTASSIUM CHLORIDE, CALCIUM CHLORIDE 600; 310; 30; 20 MG/100ML; MG/100ML; MG/100ML; MG/100ML
100 INJECTION, SOLUTION INTRAVENOUS CONTINUOUS
Status: ACTIVE | OUTPATIENT
Start: 2024-01-09 | End: 2024-01-10

## 2024-01-09 RX ORDER — CYCLOBENZAPRINE HCL 5 MG
10 TABLET ORAL 3 TIMES DAILY PRN
Status: DISCONTINUED | OUTPATIENT
Start: 2024-01-09 | End: 2024-01-10 | Stop reason: HOSPADM

## 2024-01-09 RX ORDER — AMLODIPINE BESYLATE 5 MG/1
5 TABLET ORAL DAILY
Status: DISCONTINUED | OUTPATIENT
Start: 2024-01-10 | End: 2024-01-10 | Stop reason: HOSPADM

## 2024-01-09 RX ORDER — ATORVASTATIN CALCIUM 20 MG/1
20 TABLET, FILM COATED ORAL NIGHTLY
Status: DISCONTINUED | OUTPATIENT
Start: 2024-01-09 | End: 2024-01-10 | Stop reason: HOSPADM

## 2024-01-09 RX ORDER — METOCLOPRAMIDE HYDROCHLORIDE 5 MG/ML
10 INJECTION INTRAMUSCULAR; INTRAVENOUS ONCE AS NEEDED
Status: DISCONTINUED | OUTPATIENT
Start: 2024-01-09 | End: 2024-01-09 | Stop reason: HOSPADM

## 2024-01-09 RX ORDER — PREGABALIN 75 MG/1
75 CAPSULE ORAL ONCE
Status: COMPLETED | OUTPATIENT
Start: 2024-01-09 | End: 2024-01-09

## 2024-01-09 RX ORDER — MELOXICAM 7.5 MG/1
7.5 TABLET ORAL ONCE
Status: COMPLETED | OUTPATIENT
Start: 2024-01-09 | End: 2024-01-09

## 2024-01-09 RX ORDER — TRANEXAMIC ACID 100 MG/ML
INJECTION, SOLUTION INTRAVENOUS AS NEEDED
Status: DISCONTINUED | OUTPATIENT
Start: 2024-01-09 | End: 2024-01-09

## 2024-01-09 RX ORDER — NALOXONE HYDROCHLORIDE 1 MG/ML
0.2 INJECTION INTRAMUSCULAR; INTRAVENOUS; SUBCUTANEOUS EVERY 5 MIN PRN
Status: DISCONTINUED | OUTPATIENT
Start: 2024-01-09 | End: 2024-01-09 | Stop reason: SDUPTHER

## 2024-01-09 RX ORDER — DEXAMETHASONE SODIUM PHOSPHATE 4 MG/ML
INJECTION, SOLUTION INTRA-ARTICULAR; INTRALESIONAL; INTRAMUSCULAR; INTRAVENOUS; SOFT TISSUE AS NEEDED
Status: DISCONTINUED | OUTPATIENT
Start: 2024-01-09 | End: 2024-01-09

## 2024-01-09 RX ADMIN — TRANEXAMIC ACID 1950 MG: 650 TABLET ORAL at 13:22

## 2024-01-09 RX ADMIN — ACETAMINOPHEN 650 MG: 325 TABLET ORAL at 14:16

## 2024-01-09 RX ADMIN — Medication 200 MCG: at 09:56

## 2024-01-09 RX ADMIN — OXYCODONE HYDROCHLORIDE 5 MG: 5 TABLET ORAL at 14:16

## 2024-01-09 RX ADMIN — POLYETHYLENE GLYCOL 3350 17 G: 17 POWDER, FOR SOLUTION ORAL at 21:12

## 2024-01-09 RX ADMIN — Medication 100 MCG: at 10:09

## 2024-01-09 RX ADMIN — CYCLOBENZAPRINE 10 MG: 10 TABLET, FILM COATED ORAL at 16:11

## 2024-01-09 RX ADMIN — CEFAZOLIN 2 G: 1 INJECTION, POWDER, FOR SOLUTION INTRAMUSCULAR; INTRAVENOUS at 08:50

## 2024-01-09 RX ADMIN — OXYCODONE HYDROCHLORIDE 10 MG: 5 TABLET ORAL at 18:13

## 2024-01-09 RX ADMIN — Medication 100 MCG: at 09:33

## 2024-01-09 RX ADMIN — CEFAZOLIN SODIUM 2 G: 2 INJECTION, SOLUTION INTRAVENOUS at 21:12

## 2024-01-09 RX ADMIN — PROPOFOL 80 MCG/KG/MIN: 10 INJECTION, EMULSION INTRAVENOUS at 08:50

## 2024-01-09 RX ADMIN — Medication 200 MCG: at 09:50

## 2024-01-09 RX ADMIN — LIDOCAINE HYDROCHLORIDE 40 MG: 20 INJECTION, SOLUTION EPIDURAL; INFILTRATION; INTRACAUDAL; PERINEURAL at 08:52

## 2024-01-09 RX ADMIN — TRANEXAMIC ACID 1000 MG: 100 INJECTION, SOLUTION INTRAVENOUS at 08:53

## 2024-01-09 RX ADMIN — ONDANSETRON 4 MG: 2 INJECTION INTRAMUSCULAR; INTRAVENOUS at 08:53

## 2024-01-09 RX ADMIN — PROPOFOL 20 MG: 10 INJECTION, EMULSION INTRAVENOUS at 08:52

## 2024-01-09 RX ADMIN — POVIDONE-IODINE 1 APPLICATION: 5 SOLUTION TOPICAL at 07:21

## 2024-01-09 RX ADMIN — SODIUM CHLORIDE, POTASSIUM CHLORIDE, SODIUM LACTATE AND CALCIUM CHLORIDE: 600; 310; 30; 20 INJECTION, SOLUTION INTRAVENOUS at 08:35

## 2024-01-09 RX ADMIN — ATORVASTATIN CALCIUM 20 MG: 20 TABLET, FILM COATED ORAL at 21:13

## 2024-01-09 RX ADMIN — OXYCODONE HYDROCHLORIDE 10 MG: 5 TABLET ORAL at 22:15

## 2024-01-09 RX ADMIN — CEFAZOLIN SODIUM 2 G: 2 INJECTION, SOLUTION INTRAVENOUS at 14:57

## 2024-01-09 RX ADMIN — PREGABALIN 75 MG: 75 CAPSULE ORAL at 07:21

## 2024-01-09 RX ADMIN — Medication 100 MCG: at 09:25

## 2024-01-09 RX ADMIN — SODIUM CHLORIDE, POTASSIUM CHLORIDE, SODIUM LACTATE AND CALCIUM CHLORIDE: 600; 310; 30; 20 INJECTION, SOLUTION INTRAVENOUS at 10:07

## 2024-01-09 RX ADMIN — ASPIRIN 81 MG: 81 TABLET, COATED ORAL at 21:13

## 2024-01-09 RX ADMIN — ACETAMINOPHEN 975 MG: 325 TABLET ORAL at 07:22

## 2024-01-09 RX ADMIN — Medication 100 MCG: at 09:36

## 2024-01-09 RX ADMIN — DEXAMETHASONE SODIUM PHOSPHATE 4 MG: 4 INJECTION, SOLUTION INTRAMUSCULAR; INTRAVENOUS at 08:53

## 2024-01-09 RX ADMIN — MELOXICAM 7.5 MG: 7.5 TABLET ORAL at 07:21

## 2024-01-09 RX ADMIN — MIDAZOLAM HYDROCHLORIDE 2 MG: 1 INJECTION, SOLUTION INTRAMUSCULAR; INTRAVENOUS at 08:22

## 2024-01-09 RX ADMIN — Medication 200 MCG: at 09:44

## 2024-01-09 RX ADMIN — OXYCODONE HYDROCHLORIDE 5 MG: 5 TABLET ORAL at 13:27

## 2024-01-09 RX ADMIN — ACETAMINOPHEN 650 MG: 325 TABLET ORAL at 23:52

## 2024-01-09 SDOH — SOCIAL STABILITY: SOCIAL INSECURITY: HAS ANYONE EVER THREATENED TO HURT YOUR FAMILY OR YOUR PETS?: NO

## 2024-01-09 SDOH — SOCIAL STABILITY: SOCIAL INSECURITY: WERE YOU ABLE TO COMPLETE ALL THE BEHAVIORAL HEALTH SCREENINGS?: YES

## 2024-01-09 SDOH — SOCIAL STABILITY: SOCIAL INSECURITY: DO YOU FEEL ANYONE HAS EXPLOITED OR TAKEN ADVANTAGE OF YOU FINANCIALLY OR OF YOUR PERSONAL PROPERTY?: NO

## 2024-01-09 SDOH — SOCIAL STABILITY: SOCIAL INSECURITY: ARE YOU OR HAVE YOU BEEN THREATENED OR ABUSED PHYSICALLY, EMOTIONALLY, OR SEXUALLY BY ANYONE?: NO

## 2024-01-09 SDOH — SOCIAL STABILITY: SOCIAL INSECURITY: HAVE YOU HAD THOUGHTS OF HARMING ANYONE ELSE?: NO

## 2024-01-09 SDOH — SOCIAL STABILITY: SOCIAL INSECURITY: DOES ANYONE TRY TO KEEP YOU FROM HAVING/CONTACTING OTHER FRIENDS OR DOING THINGS OUTSIDE YOUR HOME?: NO

## 2024-01-09 SDOH — SOCIAL STABILITY: SOCIAL INSECURITY: ARE THERE ANY APPARENT SIGNS OF INJURIES/BEHAVIORS THAT COULD BE RELATED TO ABUSE/NEGLECT?: NO

## 2024-01-09 SDOH — SOCIAL STABILITY: SOCIAL INSECURITY: ABUSE: ADULT

## 2024-01-09 SDOH — HEALTH STABILITY: MENTAL HEALTH: CURRENT SMOKER: 0

## 2024-01-09 SDOH — SOCIAL STABILITY: SOCIAL INSECURITY: DO YOU FEEL UNSAFE GOING BACK TO THE PLACE WHERE YOU ARE LIVING?: NO

## 2024-01-09 ASSESSMENT — PAIN SCALES - GENERAL
PAINLEVEL_OUTOF10: 4
PAINLEVEL_OUTOF10: 5 - MODERATE PAIN
PAINLEVEL_OUTOF10: 5 - MODERATE PAIN
PAINLEVEL_OUTOF10: 0 - NO PAIN
PAINLEVEL_OUTOF10: 0 - NO PAIN
PAINLEVEL_OUTOF10: 8
PAINLEVEL_OUTOF10: 0 - NO PAIN
PAINLEVEL_OUTOF10: 8
PAINLEVEL_OUTOF10: 5 - MODERATE PAIN
PAINLEVEL_OUTOF10: 0 - NO PAIN
PAINLEVEL_OUTOF10: 7
PAINLEVEL_OUTOF10: 10 - WORST POSSIBLE PAIN
PAINLEVEL_OUTOF10: 8
PAINLEVEL_OUTOF10: 0 - NO PAIN
PAINLEVEL_OUTOF10: 3
PAINLEVEL_OUTOF10: 0 - NO PAIN
PAINLEVEL_OUTOF10: 0 - NO PAIN

## 2024-01-09 ASSESSMENT — PAIN - FUNCTIONAL ASSESSMENT

## 2024-01-09 ASSESSMENT — COGNITIVE AND FUNCTIONAL STATUS - GENERAL
CLIMB 3 TO 5 STEPS WITH RAILING: A LOT
HELP NEEDED FOR BATHING: A LITTLE
WALKING IN HOSPITAL ROOM: A LITTLE
MOBILITY SCORE: 24
DAILY ACTIVITIY SCORE: 24
TURNING FROM BACK TO SIDE WHILE IN FLAT BAD: A LITTLE
PATIENT BASELINE BEDBOUND: NO
DRESSING REGULAR LOWER BODY CLOTHING: A LITTLE
STANDING UP FROM CHAIR USING ARMS: A LITTLE
WALKING IN HOSPITAL ROOM: A LITTLE
MOBILITY SCORE: 18
MOVING TO AND FROM BED TO CHAIR: A LITTLE
MOVING FROM LYING ON BACK TO SITTING ON SIDE OF FLAT BED WITH BEDRAILS: A LITTLE
STANDING UP FROM CHAIR USING ARMS: A LITTLE
MOVING TO AND FROM BED TO CHAIR: A LITTLE
CLIMB 3 TO 5 STEPS WITH RAILING: A LITTLE
DAILY ACTIVITIY SCORE: 21
TURNING FROM BACK TO SIDE WHILE IN FLAT BAD: A LITTLE
TOILETING: A LITTLE
MOBILITY SCORE: 18

## 2024-01-09 ASSESSMENT — ACTIVITIES OF DAILY LIVING (ADL)
JUDGMENT_ADEQUATE_SAFELY_COMPLETE_DAILY_ACTIVITIES: YES
ADEQUATE_TO_COMPLETE_ADL: YES
HEARING - RIGHT EAR: FUNCTIONAL
ADL_ASSISTANCE: NEEDS ASSISTANCE
HEARING - LEFT EAR: FUNCTIONAL
TOILETING: INDEPENDENT
FEEDING YOURSELF: INDEPENDENT
WALKS IN HOME: INDEPENDENT
DRESSING YOURSELF: INDEPENDENT
PATIENT'S MEMORY ADEQUATE TO SAFELY COMPLETE DAILY ACTIVITIES?: YES
BATHING: INDEPENDENT
GROOMING: INDEPENDENT

## 2024-01-09 ASSESSMENT — PAIN DESCRIPTION - ORIENTATION
ORIENTATION: LEFT
ORIENTATION: LEFT

## 2024-01-09 ASSESSMENT — PAIN DESCRIPTION - LOCATION
LOCATION: HIP

## 2024-01-09 ASSESSMENT — LIFESTYLE VARIABLES
AUDIT-C TOTAL SCORE: -1
SKIP TO QUESTIONS 9-10: 0
AUDIT-C TOTAL SCORE: -1
PRESCIPTION_ABUSE_PAST_12_MONTHS: NO
HOW MANY STANDARD DRINKS CONTAINING ALCOHOL DO YOU HAVE ON A TYPICAL DAY: PATIENT DECLINED
HOW OFTEN DO YOU HAVE 6 OR MORE DRINKS ON ONE OCCASION: PATIENT DECLINED
HOW OFTEN DO YOU HAVE A DRINK CONTAINING ALCOHOL: PATIENT DECLINED
SUBSTANCE_ABUSE_PAST_12_MONTHS: NO

## 2024-01-09 ASSESSMENT — COLUMBIA-SUICIDE SEVERITY RATING SCALE - C-SSRS
6. HAVE YOU EVER DONE ANYTHING, STARTED TO DO ANYTHING, OR PREPARED TO DO ANYTHING TO END YOUR LIFE?: NO
2. HAVE YOU ACTUALLY HAD ANY THOUGHTS OF KILLING YOURSELF?: NO
1. IN THE PAST MONTH, HAVE YOU WISHED YOU WERE DEAD OR WISHED YOU COULD GO TO SLEEP AND NOT WAKE UP?: NO

## 2024-01-09 ASSESSMENT — PATIENT HEALTH QUESTIONNAIRE - PHQ9
SUM OF ALL RESPONSES TO PHQ9 QUESTIONS 1 & 2: 0
2. FEELING DOWN, DEPRESSED OR HOPELESS: NOT AT ALL
1. LITTLE INTEREST OR PLEASURE IN DOING THINGS: NOT AT ALL

## 2024-01-09 ASSESSMENT — PAIN DESCRIPTION - DESCRIPTORS: DESCRIPTORS: ACHING

## 2024-01-09 NOTE — ANESTHESIA POSTPROCEDURE EVALUATION
Patient: Shagufta Ngo Foster    Procedure Summary       Date: 01/09/24 Room / Location: [facility] A OR 17 / Virtual U A OR    Anesthesia Start: 0835 Anesthesia Stop: 1047    Procedure: Left Hip Total Replacement (Left: Hip) Diagnosis:       Osteoarthrosis, hip      (Osteoarthrosis, hip [M16.9])    Surgeons: Bello Saleh MD Responsible Provider: Nilda Cox MD    Anesthesia Type: spinal ASA Status: 3            Anesthesia Type: spinal    Vitals Value Taken Time   /88 01/09/24 1301   Temp 36.2 °C (97.2 °F) 01/09/24 1300   Pulse 83 01/09/24 1302   Resp 15 01/09/24 1302   SpO2 95 % 01/09/24 1302   Vitals shown include unvalidated device data.    Anesthesia Post Evaluation    Patient location during evaluation: bedside  Patient participation: complete - patient participated  Level of consciousness: awake  Pain management: adequate  Airway patency: patent  Cardiovascular status: acceptable  Respiratory status: acceptable  Hydration status: acceptable  Postoperative Nausea and Vomiting: none        No notable events documented.

## 2024-01-09 NOTE — DISCHARGE INSTRUCTIONS
Additional instructions  Ice/Elevate operative extremity.  OK to shower.  Keep Mepilex dressing in place.  Weightbearing: WBAT on operative extremity with crutches/walker.   Home PT to visit POD #1 or 2.   Start Tylenol 650 mg by mouth every 6 hours  for pain.  Start Oxycodone 1 by mouth every 6 hours as needed for pain.  Tramadol 1 tablet every 6  hours as needed for breakthrough pain.  Start Meloxicam 7.5 mg daily for 2 weeks POD #1.  Colace 100 mg twice a day for constipation.  Aspirin 81 mg by mouth twice daily. Start POD #1.  F/U with Dr. Saleh in 2 weeks.  Call [phone] for appointment time.     Postoperative Instructions: Total Joint Replacement    POSTOPERATIVE MEDICATIONS  PAIN MEDICATION  Pain medications have been prescribed for post-operative pain control. Take in conjunction with ice/cold therapy to assist with swelling and pain. If prescribed multiple pain medications, be sure to alternate administration times throughout the day so that you can take something every few hours. Consider taking Tylenol in between narcotic administration times (keep in mind Percocet/Vicodin contain Tylenol and not to exceed Tylenol limit of 4grams in 24 hours). Prescription will generally be for 7 days at a time and refills will be sent upon request. For refills, request via [employer] or call surgeon's office during business hours and follow up with your pharmacy regarding status and pickup.    Side effects may be constipation and nausea, vomiting, sleepiness, dizziness, lightheadedness, headache, blurred vision, dry mouth, sweating, itching (if you have itching, over-the -counter Benadryl can be used as needed).  You may NOT operate a motor vehicle while taking narcotic pain medication.    BLOOD THINNER  Aspirin or another medication has been prescribed as a blood thinner to prevent blood clots in your leg or lungs. Take as prescribed on the bottle. You will not receive a refill on this medication.  Do not take this  medication if you are on another blood thinner.  Do not take any anti-inflammatory medications such as Meloxicam, Celebrex, Ibuprofen, Motrin, Aleve, or Advil while using the Aspirin or another blood thinner unless instructed otherwise by your surgeon.       STOOL SOFTENERS/LAXATIVES  Post-operative constipation can result due to a combination of inactivity, anesthesia and pain medication. To help prevent this, you should increase your water and fiber intake. Physical activity, such as walking, will also help stimulate the bowels. If you are not having regular bowel movements, increase your bowel regimen!  Consider constipation prevention and treatment medications if not prescribed by your surgeon. These are available over the counter at the drug store (The pharmacist on staff may also make recommendations):  Stool Softener: Colace  Laxative: Senna, Miralax, Milk of Magnesia, Magnesium Citrate    WOUND CARE  Pain and swelling are normal following surgery and can last for weeks to months depending on the patient.  To help relieve these symptoms, please follow the post-operative pain regimen as it has been prescribed, use ice often, wear compression stockings every day as prescribed, and elevate your leg every hour.   You have a waterproof bandage on your wound and may shower with this on. The waterproof bandage is to remain in place for a minimum of 6 -7 days. Home care will remove it. You may leave your incision open to air after the bandage has been removed. Once the dressing is removed, you may see steri strips, surgical mesh, or glue. Do not peel or cut any of these items, they will fall off on their own or your surgeon will address at your follow up appointment.   DO NOT soak your incision in a bath, hot tub, pool or pond/lake for a minimum of 8 weeks following your surgery.  DO NOT use lotions, creams, ointments on your wound for a minimum of 6 weeks following your surgery. At that time you may use vitamin E to  "assist with softening of your incision.    ___X__ TOTAL HIP ARTHROPLASTY  After surgery, you will have a compression stocking on your operative leg. Continue to wear the compression stocking for 4 weeks to prevent blood clots in your leg or lungs. Remove compression stockings at night.  If there is continued drainage or bleeding, cover with an abdominal pad and tape.   posterior hip precautions:   Don't lean forward while you sit down or stand up, and don't bend past 90 degrees (like the angle in a letter \"L\"). This means you can't try to  something off the floor or bend down to tie your shoes. Don't lift your knee higher than your hip.  Don't sit on low chairs, beds, or toilets. You may want to use a raised toilet seat for a while. Sit in chairs with arms. Imagine there's a line running down the middle of your body. Keep your legs from crossing over it.  When you get into a car, back up to the seat of the car, and then sit and slide across the seat toward the middle of the car with your knees about 12 inches apart. A plastic bag on the seat can help you slide in and out of the car.  Don't cross your legs when you sit.  Don't cross your ankles while lying down.  It may help to keep a pillow between your knees when you're in bed.    _____ TOTAL KNEE ARTHROPLASTY  After surgery, you will have a compression stocking and ACE wrap on your operative leg. Continue to wear the compression stocking for 4 weeks to prevent blood clots in your leg or lungs. Remove compression stockings at night. ACE wrap can be removed on postop day 1 or removed by homecare therapist at their first visit. Can use ACE wrap intermittently for swelling.  Elevate your leg periodically to help with swelling. BE SURE to place the support under your ankle, NOT under your knee. You want your knee as straight as possible.    JOINT CARE TEAM  For nursing or wound care questions within the first 6 weeks after surgery, please contact the joint " replacement nurse at the facility where you had surgery.  If you are leaving a message, please include your full name, date of birth and date of surgery so that we can identify correctly identify you.  For messages left outside of normal business hours, your call will be returned on the next business day.  Please do not leave emergent messages outside of normal business hours that cannot wait until the next business day.  For orthopedic concerns longer than 6 weeks after your surgery, you will need to call the office to schedule an appointment to be seen.    [facility]:  Leonor Tavera RN, [phone]        RESTARTING HOME MEDICATIONS/DIET  You may restart your home medications the following day after your surgery UNLESS you have been given alternate instructions.  Follow the instructions given to you on your hospital discharge instructions for more information regarding your home medications.  Resume your normal diet after surgery. If you are on a specific type of diet for your condition, resume that instead.  Choose foods that help promote good bowel habits and prevent constipation, such as foods high in fiber. Be sure to drink water to stay hydrated and to prevent constipation.       DENTAL PROCEDURES & CLEANINGS  All patients must wait a minimum of 3 months for elective dental appointments, including routine cleanings, as to prevent total joint replacement infections. Please refer to your surgeon as to whether you will need antibiotics for future dental appointments.     EMERGENCIES  When to contact our office immediately:  Fever >101.5 for at least 48 hours after surgery or chills.  Excessive bleeding from incision(s). A small amount of drainage is normal and expected.  Signs of infection of incision(s)-excessive drainage that is soaking through your dressing (especially if it is pus-like), redness that is spreading out from the edges of your incision, or increased warmth around the  area.  Excruciating pain for which the pain medication, taken as instructed, is not helping.  Severe calf pain.  Go directly to the emergency room or call 911, if you are experiencing chest pain, shortness of breath, or difficulty breathing.    IN-HOME PHYSICAL THERAPY & OUTPATIENT PHYSICAL THERAPY  In-home physical therapy will start 1-2 days after you get home from the hospital.  The home care agency will call you prior to their first visit.  Please refer to the contact information on your hospital discharge instructions if you need to contact them.  Make sure to provide a phone number with the ability for the home care staff to leave a message if you do not answer your phone.  Please continue to complete the assigned home exercises two times per day on the days that physical therapy is not scheduled to come to your home.    Following a total knee replacement, you should plan to transition from in-home therapy to outpatient therapy 2-3 weeks after surgery.  Patients should be making their first appointment several weeks in advance to avoid delays. You may use the physical therapy location of your choice; it is the patient's responsibility to make sure the location chosen is covered by insurance.  If you are having a hip replacement and need additional therapy, please contact the office for an order.    It is common to have a temporary increase in pain and swelling upon starting outpatient physical therapy and/or changing your exercise routine.  Continue to use ice to help with symptoms.    DRIVING & TRAVEL AFTER SURGERY   Patients should anticipate waiting at least 4-6 weeks before traveling long distances after surgery.  You will need to stop to walk around ever 1 hour during your travel to help with blood clot prevention.  Please call the office or your joint nurse to discuss prior to post-surgical travel.  Patients may not drive until cleared by the joint nurse or the office.    FOLLOW-UP APPOINTMENT  Please  call your surgeon's office within 2 weeks following surgery to schedule a follow up visit.    ICE  You have been prescribed to ice your total joint at a minimum of twice per hour for 20 minutes while awake during the first 6 weeks after surgery if you are using ice packs. This will help with pain control. Be sure to have a layer of protection between the ice pack and your skin. Ice packs should be rotated on for 20 minutes and off for 20 minutes to prevent frost bite.   If you are using an ice machine, please follow ice machine instructions and tips below.    [facility] Cold Therapy Machine Recommendations    Cold therapy devices can be used before and after surgery to assist in comfort and help to reduce pain and swelling.  These devices differ from ice or ice packs whereas the mechanism circulates water through tubing and a pad to provide longer periods of cold therapy to the desired site.  While in the hospital, you can use your cold devices around the clock for optimal comfort.  We recommend using cold therapy after working with therapy or completing exercises on your own.  Once you are discharged home, there is no set schedule in which you must follow while using cold therapy.  Below are a few points to remember when using a cold therapy device:    Read the 's instructions prior to first the use.  Follow instructions for filling the cooler (water first, then ice).  Always make sure there is a layer of protection between the cold pad and your skin (Clothing, Towel, Ace Bandage, etc.)  Allow the device to circulate cold water throughout the pad prior wrapping the pad around your leg (approximately 10 minutes).  Place the pad on your leg in the desired position to meet your pain management needs and use the wraps provided to secure the pad to your body.  The purpose of this device is to use consistently throughout the day.  You do not need to need to use the 20 on, 20 off method.  During waking hours,  remove the cold pad every 1-2 hours to perform a skin check  Detach the pad from the cooler and ambulate at least once every hour  After removing the pad, allow at least 30 minutes before resuming cold therapy  You may wear the cold therapy device during periods of sleep including overnight    If you wake up during the night, you can check the skin at this time.  You do not need to wake up specifically to perform skin checks.  Empty the cooler and pad when device is not in use.  Follow 's instructions for cleaning your cold therapy device.    [facility] can assist with problems related to products purchased through the [facility]  [phone] - [place]   or   [phone] - Michelle

## 2024-01-09 NOTE — ANESTHESIA PROCEDURE NOTES
Peripheral Block    Patient location during procedure: pre-op  Start time: 1/9/2024 8:22 AM  End time: 1/9/2024 8:28 AM  Reason for block: procedure for pain and at surgeon's request  Staffing  Performed: attending   Authorized by: Nilda Cox MD    Performed by: Nilda Cox MD  Preanesthetic Checklist  Completed: patient identified, IV checked, site marked, risks and benefits discussed, surgical consent, monitors and equipment checked, pre-op evaluation and timeout performed   Timeout performed at: 1/9/2024 8:22 AM  Peripheral Block  Patient position: laying flat  Prep: ChloraPrep  Patient monitoring: heart rate and continuous pulse ox  Block type: QL  Laterality: left  Injection technique: single-shot  Guidance: ultrasound guided  Local infiltration: lidocaine  Needle  Needle type: pencil-tip   Needle gauge: 22 G  Needle length: 10 cm  Needle localization: ultrasound guidance     image stored in chart  Assessment  Injection assessment: negative aspiration for heme and no paresthesia on injection  Heart rate change: no  Additional Notes  QL single shot. informed consent obtained. risks and benefits discussed. ASA monitors placed, timeout performed. Pt positioned, prepped with chlorhexidine, draped with sterile towels. Ultrasound guidance used with visualization of the needle throughout duration of the procedure. Aspiration was negative. A total of 30 cc 0.5% ropivacaine, 100mcg epinephrine, and 4mg decadron injected between both sides. Patient tolerated procedure well.

## 2024-01-09 NOTE — OP NOTE
Left Hip Total Replacement (L) Operative Note     Date: 2024  OR Location: [facility] A OR    Name: Shagufta Crowley, : 1960, Age: 63 y.o., MRN: [id number], Sex: female    Diagnosis  Pre-op Diagnosis     * Osteoarthrosis, hip [M16.9] Post-op Diagnosis     * Osteoarthrosis, hip [M16.9]     Procedures  Left Hip Total Replacement  10869 - NC ARTHRP ACETBLR/PROX FEM PROSTC AGRFT/ALGRFT      Surgeons      * Bello Saleh - Primary    Resident/Fellow/Other Assistant:  Surgeon(s) and Role:     * Armando West MD - Resident - Assisting    Procedure Summary  Anesthesia: Consult  ASA: III  Anesthesia Staff: Anesthesiologist: Nilda oCx MD  CRNA: JAYRO Reece-CRNA  Estimated Blood Loss: 50mL  Intra-op Medications:   Medication Name Total Dose   sodium chloride 0.9 % irrigation solution 4,000 mL   sterile water irrigation solution 1,000 mL              Anesthesia Record               Intraprocedure I/O Totals          Intake    Propofol Drip 0.00 mL    The total shown is the total volume documented since Anesthesia Start was filed.    Total Intake 0 mL       Output    Est. Blood Loss 50 mL    Total Output 50 mL       Net    Net Volume -50 mL          Specimen: No specimens collected     Staff:   Circulator: Isabel Rosa RN  Relief Circulator: Domi Palma RN  Scrub Person: Telma West; Kiran Day         Drains and/or Catheters: * None in log *    Tourniquet Times:         Implants:  Implants       Type Name Action Serial No.      Joint Hip ACETABULAR CUP, SECTOR, GRIPTON, SIZE 52MM - SN/A - [id number] Implanted N/A     Screw SCREW CANCELLOUS 6.5 X 20 - [id number] Implanted      Joint Hip LINER, ALTRX, +4, 10 DEGREE, 36 X 52MM - [id number] Implanted      Joint STEM, FEMORAL, ACTIS COLLAR, STD, SIZE 4 - [id number] Implanted      Joint Hip FEMORAL HEAD, CERAMIC 36 +5 - [id number] Implanted               Findings: severe OA Left hip    Indications: Shagufta Crowley is an 63 y.o.  female who is having surgery for Osteoarthrosis, hip [M16.9]. 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 The risks, benefits, complications, treatment options, non-operative alternatives, expected recovery and outcomes were discussed with the patient. The possibilities of reaction to medication, pulmonary aspiration, injury to surrounding structures, bleeding, recurrent infection, the need for additional procedures, failure to diagnose a condition, and creating a complication requiring transfusion or operation were discussed with the patient. The patient concurred with the proposed plan, giving informed consent.  The site of surgery was properly noted/marked if necessary per policy. The patient has been actively warmed in preoperative area. Preoperative antibiotics have been ordered and given within 1 hours of incision. Venous thrombosis prophylaxis are not indicated.    Procedure Details:   Postop DX: Left hip arthritis with AVN femoral head  Postop DX: Same   Procedure: Left total hip arthroplasty  Surgeon: Bello Saleh MD  Asst : Farrah West MD  Anesthesia: Regional and spinal  Implants: Actis size 4, +5 mm neck, 36 mm head, 52 mm [place] cup, +410 degree liner.  Clinical Note: 63 year-old female with end-stage arthrosis of hip here for total hip arthroplasty.  Discussed risk of surgery including but not limited to dislocation, bleeding, infection, fracture, DVT, instability, possible need for further surgery.  Understood these risks wished proceed.  Procedure Note: Patient brought to operating room.  Timeout performed. Antibiotics given IV.  TXA 1 gm given IV.  Spinal anesthesia given.  Transferred to the OR table.  Placed in the lateral decubitus position with the operative hip up.  All bony problems were padded well.  Axillary roll was placed.  Hip was prepped and draped typical sterile fashion.  Posterior approach to the hip was performed.  Incision  through skin down to gluteal fascia.  External rotators and capsule elevated off the femoral neck and tagged.  Hip was dislocated.  Femoral neck osteotomy was performed.  Periacetabular retractors were placed.  Labrum was excised.  Acetabulum was reamed up to 51 mm,  Trial fit nicely.  52 1 screw placed up mm Acetabular cup was impacted in place with good fixation.  Superiorly with good fixation.  +4 mm 10 degree polyethylene liner was used.  Femur was broached using the Actis femoral implant system.  Broached to stable size 4 trial stem  with good fixation in the bone.  Reduced with a 5 mm neck.    Equal leg lengths.  Trial components removed.  Femoral stem was impacted in place with good fixation.  Reduced with 36 mm ceramic head.  Good flexibility and stability.  Hip was irrigated.  Capsule and external rotators repaired through drill holes.  Wounds closed in layers.  Soft compressive dressing applied. Tolerated procedure well.  Taken recovery in stable condition.  Complications:  None; patient tolerated the procedure well.    Disposition: PACU - hemodynamically stable.  Condition: stable         Additional Details: none    Attending Attestation:     Bello Saleh  Phone Number: [phone]

## 2024-01-09 NOTE — ANESTHESIA PROCEDURE NOTES
Spinal Block    Patient location during procedure: OR  Start time: 1/9/2024 8:40 AM  End time: 1/9/2024 8:48 AM  Reason for block: primary anesthetic  Staffing  Performed: CRNA   Authorized by: Nilda Cox MD    Performed by: MANDY Reece    Preanesthetic Checklist  Completed: patient identified, IV checked, risks and benefits discussed, surgical consent, pre-op evaluation, timeout performed and sterile techniques followed  Block Timeout  RN/Licensed healthcare professional reads aloud to the Anesthesia provider and entire team: Patient identity, procedure with side and site, patient position, and as applicable the availability of implants/special equipment/special requirements.  Patient on coagulant treatment: no  Timeout performed at:   Spinal Block  Patient position: sitting  Prep: Betadine  Sterility prep: cap, gloves, drape and mask  Sedation level: light sedation  Patient monitoring: blood pressure and continuous pulse oximetry  Approach: midline  Vertebral space: L3-4  Injection technique: single-shot  Needle  Needle gauge: 24 G  Needle length: 4 in  Free flowing CSF: yes    Assessment  Procedure assessment: patient sedated but conversant throughout procedure  Additional Notes  1.5 ml .75% bupivacaine at 0846

## 2024-01-09 NOTE — HH CARE COORDINATION
Home Care received a Referral for Physical Therapy. We have processed the referral for a Start of Care on 01/10.     If you have any questions or concerns, please feel free to contact us at [phone]. Follow the prompts, enter your five digit zip code, and you will be directed to your care team on [facility].

## 2024-01-09 NOTE — ANESTHESIA PREPROCEDURE EVALUATION
Patient: Shagufta Ngo Foster    Procedure Information       Date/Time: 01/09/24 0830    Procedure: Left Hip Total Replacement (Left: Hip)    Location: [facility] OR 17 / Virtual [facility] OR    Surgeons: Bello Saleh MD          63 yoF presenting for L hip replacement.  Relevant Problems   Anesthesia   (-) Awareness under anesthesia   (-) Difficult intravenous access   (-) PONV (postoperative nausea and vomiting)      Cardiovascular   (+) HLD (hyperlipidemia)   (+) HTN (hypertension)      GI/Hepatic   (+) Biliary stone      Musculoskeletal   (+) DJD (degenerative joint disease) of knee   (+) Osteoarthritis of finger   (+) Osteoarthritis of hip   (+) Osteoarthrosis, hip       Clinical information reviewed:   Tobacco  Allergies  Meds   Med Hx  Surg Hx  OB Status  Fam Hx  Soc   Hx        NPO Detail:  NPO/Void Status  Carbohydrate Drink Given Prior to Surgery? : N  Date of Last Liquid: 01/09/24  Time of Last Liquid: 0400  Date of Last Solid: 01/08/24  Time of Last Solid: 2300  Last Intake Type: Clear fluids  Time of Last Void: 0700         Physical Exam    Airway  Mallampati: II  TM distance: >3 FB  Neck ROM: full     Cardiovascular   Rhythm: regular  Rate: normal     Dental    Pulmonary   Comments: Non labored respirations    Abdominal            Anesthesia Plan    History of general anesthesia?: yes  History of complications of general anesthesia?: no    ASA 3     spinal     The patient is not a current smoker.    intravenous induction   Postoperative administration of opioids is intended.  Trial extubation is planned.  Anesthetic plan and risks discussed with patient.  Use of blood products discussed with patient who.    Plan discussed with CRNA and CAA.

## 2024-01-09 NOTE — PROGRESS NOTES
Physical Therapy    Physical Therapy Evaluation & Treatment    Patient Name: Shagufta Crowley  MRN: [id number]  Today's Date: 1/9/2024   Time Calculation  Start Time: 1404  Stop Time: 1451  Time Calculation (min): 47 min    Assessment/Plan   PT Assessment  PT Assessment Results: Decreased strength, Decreased range of motion, Decreased endurance, Impaired balance, Decreased mobility, Pain  Rehab Prognosis: Good  Barriers to Discharge: none  Evaluation/Treatment Tolerance: Patient tolerated treatment well, Patient limited by pain  Medical Staff Made Aware: Yes  Strengths: Ability to acquire knowledge, Attitude of self, Premorbid level of function, Support of Caregivers  End of Session Communication: Bedside nurse  Assessment Comment: Pt presenting with impaired mobiltiy s/p left THR. Pt would benefit from low intensity PT to maximize functional mobiltiy, safety and indpendence.  End of Session Patient Position: Up in chair, Alarm off, not on at start of session   IP OR SWING BED PT PLAN  Inpatient or Swing Bed: Inpatient  PT Plan  Treatment/Interventions: Bed mobility, Transfer training, Gait training, Stair training, Balance training, Strengthening, Endurance training, Range of motion, Therapeutic exercise, Therapeutic activity, Positioning  PT Plan: Skilled PT  PT Frequency: BID  PT Discharge Recommendations: Low intensity level of continued care  Equipment Recommended upon Discharge: Wheeled walker  PT Recommended Transfer Status: Assist x1  PT - OK to Discharge: Yes (Per POC)      Subjective     General Visit Information:  General  Reason for Referral: 63 year old POD 0 left THR for left hip OA  Referred By: Delfino LÓPEZ)  Past Medical History Relevant to Rehab: HLD, HTN, obese, prediabetic  Family/Caregiver Present: Yes ()  Prior to Session Communication: Bedside nurse  Patient Position Received: Bed, 2 rail up, Alarm off, not on at start of session  Preferred Learning Style: auditory, kinesthetic,  verbal, visual, written  General Comment: Pt received supine lying, SCDs in place.  in room and no apparent distress  Home Living:  Home Living  Type of Home: House  Lives With: Spouse  Home Adaptive Equipment: Walker rolling or standard  Home Layout: One level  Home Access: Stairs to enter with rails  Entrance Stairs-Rails: Left  Entrance Stairs-Number of Steps: 3  Bathroom Shower/Tub: Walk-in shower  Bathroom Toilet: Handicapped height  Bathroom Equipment: Built-in shower seat  Prior Level of Function:  Prior Function Per Pt/Caregiver Report  Level of [place]: Needs assistance with ADLs, Needs assistance with homemaking (uses a FWW. Pt states she was independent without a device up until the past 2 weeks where she has relied on her  to help with all ADLs due to left hip pain)  Receives Help From: Family  ADL Assistance: Needs assistance  Precautions:  Precautions  Medical Precautions: Fall precautions  Post-Surgical Precautions: Left hip precautions  Vital Signs:  Vital Signs  Heart Rate: 104  Heart Rate Source: Monitor  SpO2: 97 %  BP: 131/81  Patient Position: Sitting    Objective   Pain:  Pain Assessment  Pain Assessment: 0-10  Pain Score: 10 - Worst possible pain  Pain Type: Surgical pain  Pain Location: Hip  Pain Orientation: Left  Cognition:  Cognition  Overall Cognitive Status: Within Functional Limits  Attention: Within Functional Limits  Memory: Within Funtional Limits  Impulsive: Within functional limits  Processing Speed: Within funtional limits    General Assessments:                  Activity Tolerance  Endurance: Endurance does not limit participation in activity         Strength  Strength Comments: RLE grossly WFLs, LLE ROM and strength impaired due to post op pain  Strength  Strength Comments: RLE grossly WFLs, LLE ROM and strength impaired due to post op pain           Coordination  Movements are Fluid and Coordinated: Yes         Static Sitting Balance  Static Sitting-Balance  Support: Feet supported  Static Sitting-Level of Assistance: Close supervision       Functional Assessments:       Bed Mobility  Bed Mobility: Yes  Bed Mobility 1  Bed Mobility 1: Supine to sitting  Level of Assistance 1: Minimum assistance  Bed Mobility Comments 1: Assist to help wtih weight of left LE    Transfers  Transfer: Yes  Transfer 1  Technique 1: Sit to stand  Transfer Device 1: Walker (x1)  Transfer Level of Assistance 1: Minimum assistance  Transfers 2  Technique 2: Stand to sit  Transfer Device 2: Walker  Transfer Level of Assistance 2: Minimal verbal cues (x1)    Ambulation/Gait Training  Ambulation/Gait Training Performed: Yes  Ambulation/Gait Training 1  Surface 1: Level tile  Device 1: Rolling walker  Assistance 1: Minimum assistance  Quality of Gait 1: Antalgic  Comments/Distance (ft) 1: Pt ambulated 50 feet x2 with FWW and min assist x1. Antalgic gait pattern noted with decreased stance on RLE. Pt required cueing for pattern/sequencing. Pt not ready to attempt stairs at this time.            Extremity/Trunk Assessments:  RLE   RLE : Within Functional Limits  LLE   LLE : Exceptions to WFL (Impaired due to post op pain)  Treatments:       Aquatic Exercise  Aquatic Exercise Performed: Yes  Aquatic Exercise Activity 1: foot and ankle pumping x20 bilat  Aquatic Exercise Activity 2: quad sets on L x10  Aquatic Exercise Activity 3: glut sets bilat x10  Aquatic Exercise Activity 4: heel slides on left x5  Aquatic Exercise Activity 5: SAQ x10 on right  Aquatic Exercise Activity 6: hip abduction x 5 on left  Aquatic Exercise Activity 7: LAQ x10 on left                        Bed Mobility  Bed Mobility: Yes  Bed Mobility 1  Bed Mobility 1: Supine to sitting  Level of Assistance 1: Minimum assistance  Bed Mobility Comments 1: Assist to help wtih weight of left LE    Ambulation/Gait Training  Ambulation/Gait Training Performed: Yes  Ambulation/Gait Training 1  Surface 1: Level tile  Device 1: Rolling  walker  Assistance 1: Minimum assistance  Quality of Gait 1: Antalgic  Comments/Distance (ft) 1: Pt ambulated 50 feet x2 with FWW and min assist x1. Antalgic gait pattern noted with decreased stance on RLE. Pt required cueing for pattern/sequencing. Pt not ready to attempt stairs at this time.  Transfers  Transfer: Yes  Transfer 1  Technique 1: Sit to stand  Transfer Device 1: Walker (x1)  Transfer Level of Assistance 1: Minimum assistance  Transfers 2  Technique 2: Stand to sit  Transfer Device 2: Walker  Transfer Level of Assistance 2: Minimal verbal cues (x1)                   Outcome Measures:  [facility] Basic Mobility  Turning from your back to your side while in a flat bed without using bedrails: None  Moving from lying on your back to sitting on the side of a flat bed without using bedrails: A little  Moving to and from bed to chair (including a wheelchair): A little  Standing up from a chair using your arms (e.g. wheelchair or bedside chair): A little  To walk in hospital room: A little  Climbing 3-5 steps with railing: A lot  Basic Mobility - Total Score: 18    Encounter Problems       Encounter Problems (Active)       Mobility       STG - Patient will ambulate 250 feet with FWW independently       Start:  01/09/24    Expected End:  01/16/24            STG - Patient will ascend and descend four to six stairs while holding one rail independently       Start:  01/09/24    Expected End:  01/16/24               Safety       Goal 1: Pt will follow THR precautions independently       Start:  01/09/24    Expected End:  01/16/24               Transfers       STG - Transfer from bed to chair independently with FWW       Start:  01/09/24    Expected End:  01/16/24            STG - Patient to transfer to and from sit to supine independently       Start:  01/09/24    Expected End:  01/16/24            STG - Patient will perform bed mobility independently       Start:  01/09/24    Expected End:  01/16/24                    Education Documentation  Handouts, taught by Sarai Garg PT at 1/9/2024  3:42 PM.  Learner: Patient  Readiness: Acceptance  Method: Explanation  Response: Verbalizes Understanding, Demonstrated Understanding, Needs Reinforcement    Precautions, taught by Sarai Garg PT at 1/9/2024  3:42 PM.  Learner: Patient  Readiness: Acceptance  Method: Explanation  Response: Verbalizes Understanding, Demonstrated Understanding, Needs Reinforcement    Body Mechanics, taught by Sarai Garg, PT at 1/9/2024  3:42 PM.  Learner: Patient  Readiness: Acceptance  Method: Explanation  Response: Verbalizes Understanding, Demonstrated Understanding, Needs Reinforcement    Home Exercise Program, taught by Sarai Garg PT at 1/9/2024  3:42 PM.  Learner: Patient  Readiness: Acceptance  Method: Explanation  Response: Verbalizes Understanding, Demonstrated Understanding, Needs Reinforcement    Mobility Training, taught by Sarai Garg, PT at 1/9/2024  3:42 PM.  Learner: Patient  Readiness: Acceptance  Method: Explanation  Response: Verbalizes Understanding, Demonstrated Understanding, Needs Reinforcement    Education Comments  No comments found.

## 2024-01-10 ENCOUNTER — APPOINTMENT (OUTPATIENT)
Dept: CARDIOLOGY | Facility: HOSPITAL | Age: 64
DRG: 470 | End: 2024-01-10
Payer: COMMERCIAL

## 2024-01-10 ENCOUNTER — PHARMACY VISIT (OUTPATIENT)
Dept: PHARMACY | Facility: CLINIC | Age: 64
End: 2024-01-10
Payer: MEDICARE

## 2024-01-10 VITALS
HEIGHT: 62 IN | BODY MASS INDEX: 39.96 KG/M2 | TEMPERATURE: 98.4 F | SYSTOLIC BLOOD PRESSURE: 115 MMHG | RESPIRATION RATE: 16 BRPM | WEIGHT: 217.15 LBS | OXYGEN SATURATION: 96 % | DIASTOLIC BLOOD PRESSURE: 78 MMHG | HEART RATE: 89 BPM

## 2024-01-10 LAB
ANION GAP SERPL CALC-SCNC: 14 MMOL/L (ref 10–20)
BUN SERPL-MCNC: 12 MG/DL (ref 6–23)
CALCIUM SERPL-MCNC: 8.8 MG/DL (ref 8.6–10.3)
CHLORIDE SERPL-SCNC: 101 MMOL/L (ref 98–107)
CO2 SERPL-SCNC: 22 MMOL/L (ref 21–32)
CREAT SERPL-MCNC: 0.86 MG/DL (ref 0.5–1.05)
EGFRCR SERPLBLD CKD-EPI 2021: 77 ML/MIN/1.73M*2
ERYTHROCYTE [DISTWIDTH] IN BLOOD BY AUTOMATED COUNT: 11.6 % (ref 11.5–14.5)
GLUCOSE SERPL-MCNC: 196 MG/DL (ref 74–99)
HCT VFR BLD AUTO: 39.1 % (ref 36–46)
HGB BLD-MCNC: 13 G/DL (ref 12–16)
MCH RBC QN AUTO: 30.4 PG (ref 26–34)
MCHC RBC AUTO-ENTMCNC: 33.2 G/DL (ref 32–36)
MCV RBC AUTO: 91 FL (ref 80–100)
NRBC BLD-RTO: 0 /100 WBCS (ref 0–0)
PLATELET # BLD AUTO: 260 X10*3/UL (ref 150–450)
POTASSIUM SERPL-SCNC: 3.5 MMOL/L (ref 3.5–5.3)
RBC # BLD AUTO: 4.28 X10*6/UL (ref 4–5.2)
SODIUM SERPL-SCNC: 133 MMOL/L (ref 136–145)
WBC # BLD AUTO: 11.3 X10*3/UL (ref 4.4–11.3)

## 2024-01-10 PROCEDURE — 97535 SELF CARE MNGMENT TRAINING: CPT | Mod: GO

## 2024-01-10 PROCEDURE — 97116 GAIT TRAINING THERAPY: CPT | Mod: GP,CQ

## 2024-01-10 PROCEDURE — RXMED WILLOW AMBULATORY MEDICATION CHARGE

## 2024-01-10 PROCEDURE — 2500000004 HC RX 250 GENERAL PHARMACY W/ HCPCS (ALT 636 FOR OP/ED): Performed by: ORTHOPAEDIC SURGERY

## 2024-01-10 PROCEDURE — 2500000001 HC RX 250 WO HCPCS SELF ADMINISTERED DRUGS (ALT 637 FOR MEDICARE OP): Performed by: ORTHOPAEDIC SURGERY

## 2024-01-10 PROCEDURE — 80048 BASIC METABOLIC PNL TOTAL CA: CPT

## 2024-01-10 PROCEDURE — 97530 THERAPEUTIC ACTIVITIES: CPT | Mod: GP,CQ

## 2024-01-10 PROCEDURE — 85027 COMPLETE CBC AUTOMATED: CPT | Performed by: ORTHOPAEDIC SURGERY

## 2024-01-10 PROCEDURE — 2500000001 HC RX 250 WO HCPCS SELF ADMINISTERED DRUGS (ALT 637 FOR MEDICARE OP): Performed by: PHARMACIST

## 2024-01-10 PROCEDURE — 93005 ELECTROCARDIOGRAM TRACING: CPT

## 2024-01-10 PROCEDURE — 36415 COLL VENOUS BLD VENIPUNCTURE: CPT | Performed by: ORTHOPAEDIC SURGERY

## 2024-01-10 PROCEDURE — 97165 OT EVAL LOW COMPLEX 30 MIN: CPT | Mod: GO

## 2024-01-10 PROCEDURE — 97110 THERAPEUTIC EXERCISES: CPT | Mod: GP,CQ

## 2024-01-10 PROCEDURE — 99231 SBSQ HOSP IP/OBS SF/LOW 25: CPT | Performed by: NURSE PRACTITIONER

## 2024-01-10 PROCEDURE — G0378 HOSPITAL OBSERVATION PER HR: HCPCS

## 2024-01-10 PROCEDURE — 2500000004 HC RX 250 GENERAL PHARMACY W/ HCPCS (ALT 636 FOR OP/ED): Performed by: PHARMACIST

## 2024-01-10 RX ADMIN — VALSARTAN 160 MG: 160 TABLET, FILM COATED ORAL at 09:21

## 2024-01-10 RX ADMIN — AMLODIPINE BESYLATE 5 MG: 5 TABLET ORAL at 09:21

## 2024-01-10 RX ADMIN — ACETAMINOPHEN 650 MG: 325 TABLET ORAL at 05:06

## 2024-01-10 RX ADMIN — POLYETHYLENE GLYCOL 3350 17 G: 17 POWDER, FOR SOLUTION ORAL at 09:21

## 2024-01-10 RX ADMIN — CEFAZOLIN SODIUM 2 G: 2 INJECTION, SOLUTION INTRAVENOUS at 03:35

## 2024-01-10 RX ADMIN — MELOXICAM 15 MG: 7.5 TABLET ORAL at 09:21

## 2024-01-10 RX ADMIN — OXYCODONE HYDROCHLORIDE 10 MG: 5 TABLET ORAL at 09:17

## 2024-01-10 RX ADMIN — OXYCODONE HYDROCHLORIDE 10 MG: 5 TABLET ORAL at 13:15

## 2024-01-10 RX ADMIN — ASPIRIN 81 MG: 81 TABLET, COATED ORAL at 09:21

## 2024-01-10 RX ADMIN — METFORMIN HYDROCHLORIDE 500 MG: 500 TABLET, EXTENDED RELEASE ORAL at 09:21

## 2024-01-10 RX ADMIN — OXYCODONE HYDROCHLORIDE 10 MG: 5 TABLET ORAL at 05:06

## 2024-01-10 RX ADMIN — ACETAMINOPHEN 650 MG: 325 TABLET ORAL at 13:14

## 2024-01-10 ASSESSMENT — COGNITIVE AND FUNCTIONAL STATUS - GENERAL
MOVING TO AND FROM BED TO CHAIR: A LITTLE
TURNING FROM BACK TO SIDE WHILE IN FLAT BAD: A LITTLE
MOBILITY SCORE: 18
TURNING FROM BACK TO SIDE WHILE IN FLAT BAD: A LITTLE
MOVING FROM LYING ON BACK TO SITTING ON SIDE OF FLAT BED WITH BEDRAILS: A LITTLE
DAILY ACTIVITIY SCORE: 21
MOBILITY SCORE: 18
HELP NEEDED FOR BATHING: A LITTLE
WALKING IN HOSPITAL ROOM: A LITTLE
CLIMB 3 TO 5 STEPS WITH RAILING: A LITTLE
STANDING UP FROM CHAIR USING ARMS: A LITTLE
DRESSING REGULAR LOWER BODY CLOTHING: A LITTLE
CLIMB 3 TO 5 STEPS WITH RAILING: A LITTLE
MOVING FROM LYING ON BACK TO SITTING ON SIDE OF FLAT BED WITH BEDRAILS: A LITTLE
TOILETING: A LITTLE
WALKING IN HOSPITAL ROOM: A LITTLE
MOVING TO AND FROM BED TO CHAIR: A LITTLE
STANDING UP FROM CHAIR USING ARMS: A LITTLE

## 2024-01-10 ASSESSMENT — PAIN SCALES - GENERAL
PAINLEVEL_OUTOF10: 7
PAINLEVEL_OUTOF10: 8
PAINLEVEL_OUTOF10: 4
PAINLEVEL_OUTOF10: 6

## 2024-01-10 ASSESSMENT — PAIN DESCRIPTION - LOCATION
LOCATION: HIP

## 2024-01-10 ASSESSMENT — ACTIVITIES OF DAILY LIVING (ADL)
BATHING_LEVEL_OF_ASSISTANCE: SETUP;MODIFIED INDEPENDENT
BATHING_ASSISTANCE: MINIMAL
ADL_ASSISTANCE: NEEDS ASSISTANCE
BATHING_EQUIPMENT_NEEDED: LONG-HANDLED SPONGE
BATHING_ASSISTANCE: MODERATE
HOME_MANAGEMENT_TIME_ENTRY: 35

## 2024-01-10 ASSESSMENT — PAIN - FUNCTIONAL ASSESSMENT
PAIN_FUNCTIONAL_ASSESSMENT: 0-10

## 2024-01-10 ASSESSMENT — PAIN DESCRIPTION - ORIENTATION
ORIENTATION: LEFT

## 2024-01-10 ASSESSMENT — PAIN DESCRIPTION - DESCRIPTORS: DESCRIPTORS: ACHING;BURNING;SORE

## 2024-01-10 NOTE — PROGRESS NOTES
Patient to be discharge home with [facility].  Confirmed SOC of 1/11/23  Judy Rose RN

## 2024-01-10 NOTE — NURSING NOTE
Interdisciplinary team present: NP, PT, NM, CC, SW, Orthopedic Coordinator, and bedside RN.  Pain - controlled  Nausea - none  Discharge barrier - none  Discharge plan - home [facility]  Discharge date/time -  1/10/24 AM

## 2024-01-10 NOTE — PROGRESS NOTES
Physical Therapy    Physical Therapy Treatment    Patient Name: Shagufta Crowley  MRN: [id number]  Today's Date: 1/10/2024  Time Calculation  Start Time: 1300  Stop Time: 1348  Time Calculation (min): 48 min       Assessment/Plan   PT Assessment  End of Session Communication: Bedside nurse  End of Session Patient Position: Bed, 3 rail up, Alarm off, caregiver present  PT Plan  Inpatient/Swing Bed or Outpatient: Inpatient  PT Plan  Treatment/Interventions: Bed mobility, Transfer training, Gait training, Stair training  PT Plan: Skilled PT  PT Frequency: BID  PT Discharge Recommendations: Low intensity level of continued care  Equipment Recommended upon Discharge: Wheeled walker  PT Recommended Transfer Status: Assist x1  PT - OK to Discharge: Yes (per POC)      General Visit Information:   PT  Visit  PT Received On: 01/10/24  General  Reason for Referral: 63 year old POD 0 left THR for left hip OA  Referred By: Delfino LÓPEZ)  Past Medical History Relevant to Rehab: HLD, HTN, obese, prediabetic  Family/Caregiver Present: Yes ()  Prior to Session Communication: Bedside nurse  Patient Position Received: Bed, 3 rail up, Alarm off, not on at start of session, Alarm off, caregiver present    Subjective   Precautions:     Vital Signs:  Vital Signs  Heart Rate: 96  Heart Rate Source: Other (Comment) (pulseox)  SpO2: 98 %  Patient Position: Sitting    Objective   Pain:  Pain Assessment  Pain Assessment: 0-10  Pain Score: 7  Pain Type: Surgical pain  Pain Location: Hip  Pain Orientation: Left  Cognition:     Postural Control:     Extremity/Trunk Assessments:    Activity Tolerance:     Treatments:  Bed Mobility  Bed Mobility: Yes  Bed Mobility 1  Bed Mobility 1: Sitting to supine  Level of Assistance 1: Minimum assistance  Bed Mobility Comments 1: HOB elevated.  Min A for LLE into bed.    Ambulation/Gait Training  Ambulation/Gait Training Performed: Yes  Ambulation/Gait Training 1  Surface 1: Level tile  Device 1:  Rolling walker  Gait Support Devices: Gait belt  Assistance 1: Close supervision  Quality of Gait 1: Antalgic  Comments/Distance (ft) 1: 100 x 2 (Improved sequencing noted with amublation, step to gait pattern.  Increased emphasis on avoiding IR of the left hip with ambulation.  Significantly slow pacing noted.)  Transfers  Transfer: Yes  Transfer 1  Technique 1: Sit to stand, Stand to sit  Transfer Device 1: Walker, Gait belt  Transfer Level of Assistance 1: Distant supervision  Trials/Comments 1: Improved safety awareness with good sequencing and hand placement.  Bed elevated due to discomfort in the left hip.    Stairs  Stairs: Yes  Stairs  Rails 1: Bilateral, Left  Curb Step 1: No  Device 1: Railing  Support Devices 1: Gait belt  Assistance 1: Close supervision  Comment/Number of Steps 1: x 2 trials.  Pt ascended/descended 4 steps, step to gait pattern.  1st trial with bilateral hand rails due to apprehension of 1 hand rail.  Pt apprehensive to perform with 1 HR and HHA or 1 HR and cane.  Performed 2nd trial with knee stabilization from PT and L HR.  Pt able to perform safety.    Outcome Measures:  [facility] Basic Mobility  Turning from your back to your side while in a flat bed without using bedrails: A little  Moving from lying on your back to sitting on the side of a flat bed without using bedrails: A little  Moving to and from bed to chair (including a wheelchair): A little  Standing up from a chair using your arms (e.g. wheelchair or bedside chair): A little  To walk in hospital room: A little  Climbing 3-5 steps with railing: A little  Basic Mobility - Total Score: 18    Education Documentation  Handouts, taught by Keisha Tobin PTA at 1/10/2024  2:07 PM.  Learner: Family, Patient  Readiness: Acceptance  Method: Explanation  Response: Verbalizes Understanding    Precautions, taught by Keisha Tobin PTA at 1/10/2024  2:07 PM.  Learner: Family, Patient  Readiness: Acceptance  Method: Explanation  Response:  Verbalizes Understanding    Body Mechanics, taught by Keisha Tobin PTA at 1/10/2024  2:07 PM.  Learner: Family, Patient  Readiness: Acceptance  Method: Explanation  Response: Verbalizes Understanding    Home Exercise Program, taught by Keisha Tobin PTA at 1/10/2024  2:07 PM.  Learner: Family, Patient  Readiness: Acceptance  Method: Explanation  Response: Verbalizes Understanding    Mobility Training, taught by Keisha Tobin PTA at 1/10/2024  2:07 PM.  Learner: Family, Patient  Readiness: Acceptance  Method: Explanation  Response: Verbalizes Understanding    Education Comments  No comments found.        OP EDUCATION:  Outpatient Education  Individual(s) Educated: Patient, Spouse  Education Provided: Post-Op Precautions, Other  Education Comment: Eduated pt on car transfer and post op precautions with good understanding of each.    Encounter Problems       Encounter Problems (Active)       Mobility       STG - Patient will ambulate 250 feet with FWW independently (Progressing)       Start:  01/09/24    Expected End:  01/16/24            STG - Patient will ascend and descend four to six stairs while holding one rail independently (Progressing)       Start:  01/09/24    Expected End:  01/16/24               Safety       Goal 1: Pt will follow THR precautions independently (Progressing)       Start:  01/09/24    Expected End:  01/16/24               Transfers       STG - Transfer from bed to chair independently with FWW (Progressing)       Start:  01/09/24    Expected End:  01/16/24            STG - Patient to transfer to and from sit to supine independently (Progressing)       Start:  01/09/24    Expected End:  01/16/24            STG - Patient will perform bed mobility independently (Progressing)       Start:  01/09/24    Expected End:  01/16/24

## 2024-01-10 NOTE — DISCHARGE SUMMARY
Discharge Diagnosis  Osteoarthrosis, hip    Issues Requiring Follow-Up  S/p left total hip arthroplasty    Test Results Pending At Discharge  Pending Labs       No current pending labs.            Hospital Course   Briefly, the patient is a 63 year-old female with past Hx of osteoarthritis of left hip.  Pt is now S/P left total hip arthroplasty       HOSPITAL COURSE: The patient underwent left total hip arthroplasty on 1/9/24 which patient tolerated well and remained stable in the postoperative period. On postoperative day #1, patient was tolerating a diet, and remained afebrile with stable vital signs. Patient was ordered oral and intravenous narcotics for pain control.  Patient was judged stable for discharge home on 1/10/24,tolerating diet, afebrile, stable vital signs and lab values, with adequate pain control. The wound was clean and dry. Patient was instructed to follow up in the office within 2 weeks. Pt given prescription for pain, asa for DVT prophylaxis and compression stockings. Colace as needed for constipation. Home PT/ OT was arranged prior to discharge.      Pertinent Physical Exam At Time of Discharge  Physical Exam  Cardiovascular:      Pulses: Normal pulses.   Pulmonary:      Effort: Pulmonary effort is normal.   Abdominal:      Palpations: Abdomen is soft.   Musculoskeletal:      Comments: Left Lower Extremity:   -Skin intact with mepilex  -Tender at site of injury with painful ROM.  -Fires DF/PF/EHL/FHL  -SILT in saph/sural/SPN/DPN distributions  -Foot warm, well perfused  -Palpable DP pulse, brisk cap refill  -Compartments soft and compressible     Skin:     General: Skin is warm.      Capillary Refill: Capillary refill takes less than 2 seconds.   Neurological:      General: No focal deficit present.      Mental Status: She is alert.   Psychiatric:         Mood and Affect: Mood normal.         Home Medications     Medication List      START taking these medications     aspirin 81 mg chewable  tablet; Chew and swallow 1 tablet (81 mg) by   mouth 2 times a day for 14 days.   docusate sodium 100 mg capsule; Commonly known as: Colace; Take 1   capsule (100 mg) by mouth 2 times a day for 14 days.   oxyCODONE 5 mg immediate release tablet; Commonly known as: Roxicodone;   Take 1 tablet (5 mg) by mouth every 6 hours if needed for severe pain (7 -   10) for up to 7 days.   traMADol 50 mg tablet; Commonly known as: Ultram; Take 2 tablets (100   mg) by mouth every 6 hours if needed for severe pain (7 - 10).     CONTINUE taking these medications     amlodipine-valsartan 5-160 mg tablet; Commonly known as: Exforge; Take 1   tablet by mouth once daily.   atorvastatin 20 mg tablet; Commonly known as: Lipitor; Take 1 tablet (20   mg) by mouth once daily.   meloxicam 15 mg tablet; Commonly known as: Mobic; Take 1 tablet (15 mg)   by mouth once daily.   metFORMIN  mg 24 hr tablet; Commonly known as: Glucophage-XR; Take   1 tablet (500 mg) by mouth once daily.     STOP taking these medications     chlorhexidine 0.12 % solution; Commonly known as: Peridex       Outpatient Follow-Up  Future Appointments   Date Time Provider Department Center   1/11/2024 To Be Determined Ila Lopez PT [facility]   1/24/2024  9:15 AM Telma Mendiola DPM [id number]       JAYRO Henry-CNP

## 2024-01-10 NOTE — CARE PLAN
The patient's goals for the shift include pain control    The clinical goals for the shift include Maintain safety, pain control

## 2024-01-10 NOTE — PROGRESS NOTES
Physical Therapy    Physical Therapy Treatment    Patient Name: Shagufta Crowley  MRN: [id number]  Today's Date: 1/10/2024  Time Calculation  Start Time: 0818  Stop Time: 0922  Time Calculation (min): 64 min       Assessment/Plan   PT Assessment  End of Session Communication: Bedside nurse  End of Session Patient Position: Up in chair, Alarm off, not on at start of session (RN present)  PT Plan  Inpatient/Swing Bed or Outpatient: Inpatient  PT Plan  Treatment/Interventions: Bed mobility, Transfer training, Gait training, Stair training  PT Plan: Skilled PT  PT Frequency: BID  PT Discharge Recommendations: Low intensity level of continued care  Equipment Recommended upon Discharge: Wheeled walker  PT Recommended Transfer Status: Assist x1  PT - OK to Discharge: Yes (per POC)      General Visit Information:   PT  Visit  PT Received On: 01/10/24  General  Reason for Referral: 63 year old POD 0 left THR for left hip OA  Referred By: Delfino LÓPEZ)  Past Medical History Relevant to Rehab: HLD, HTN, obese, prediabetic  Family/Caregiver Present: Yes ()  Prior to Session Communication: Bedside nurse  Patient Position Received: Bed, 3 rail up, Alarm off, not on at start of session, Alarm off, caregiver present    Subjective   Precautions:     Vital Signs:  Vital Signs  Heart Rate: 96  Heart Rate Source: Other (Comment) (pulseox)  SpO2: 98 %  Patient Position: Sitting    Objective   Pain:  Pain Assessment  Pain Assessment: 0-10  Pain Score: 6 (8 with movement)  Pain Type: Surgical pain  Pain Location: Hip  Pain Orientation: Left  Cognition:     Postural Control:     Extremity/Trunk Assessments:    Activity Tolerance:     Treatments:  Therapeutic Exercise  Therapeutic Exercise Performed: Yes  Therapeutic Exercise Activity 1: Pt performed seated x 20 reps AP, LAQ, hip ABD/ADD.  Decreased activation noted with hip ABD on LLE.    Bed Mobility  Bed Mobility: Yes  Bed Mobility 1  Bed Mobility 1: Supine to sitting  Level of  Assistance 1: Minimum assistance  Bed Mobility Comments 1: HOB elevated.  Min A required for LLE towards EOB.  Min VC for correct hand positioning and sequencing.  Needs assistance to follow hip precautions.    Ambulation/Gait Training  Ambulation/Gait Training Performed: Yes  Ambulation/Gait Training 1  Surface 1: Level tile  Device 1: Rolling walker  Gait Support Devices: Gait belt  Assistance 1: Close supervision  Quality of Gait 1: Antalgic  Comments/Distance (ft) 1: 100 x 2, 10 x 2 (Step to gait pattern noted with elevated shoulders and slightly forward flexed posture present.  Mod VC for sequencing.  Slow pace noted.)  Transfers  Transfer: Yes  Transfer 1  Technique 1: Sit to stand, Stand to sit  Transfer Device 1: Walker, Gait belt  Transfer Level of Assistance 1: Close supervision  Trials/Comments 1: Max VC for correct hand placement on sit>stand with ~50% carry over.  Max VC to scoot towards edge of chair to assist with sit>stand with good follow through noted.  Good eccentric control and hand placement on stand>sit.  Pt able to follow hip precautions while performing.    Stairs  Stairs: Yes (Apprehension noted to perform stairs inhibiting pt's ability to perform)  Stairs  Rails 1: Bilateral  Curb Step 1: No  Device 1: Railing  Support Devices 1: Gait belt  Assistance 1: Contact guard  Comment/Number of Steps 1: Pt ascended/descended 4 steps with step to gait pattern with heavy UE use of railings.  Cues to remain facing forward when descending stairs.    Outcome Measures:  [facility] Basic Mobility  Turning from your back to your side while in a flat bed without using bedrails: A little  Moving from lying on your back to sitting on the side of a flat bed without using bedrails: A little  Moving to and from bed to chair (including a wheelchair): A little  Standing up from a chair using your arms (e.g. wheelchair or bedside chair): A little  To walk in hospital room: A little  Climbing 3-5 steps with railing: A  ranjith  Basic Mobility - Total Score: 18    Education Documentation  Precautions, taught by Keisha Tobin PTA at 1/10/2024  9:40 AM.  Learner: Family, Patient  Readiness: Acceptance  Method: Explanation  Response: Verbalizes Understanding    Body Mechanics, taught by Keisha Tobin PTA at 1/10/2024  9:40 AM.  Learner: Family, Patient  Readiness: Acceptance  Method: Explanation  Response: Verbalizes Understanding    Home Exercise Program, taught by Keisha Tobin PTA at 1/10/2024  9:40 AM.  Learner: Family, Patient  Readiness: Acceptance  Method: Explanation  Response: Verbalizes Understanding    Mobility Training, taught by Keisha Tobin PTA at 1/10/2024  9:40 AM.  Learner: Family, Patient  Readiness: Acceptance  Method: Explanation  Response: Verbalizes Understanding    Education Comments  No comments found.        OP EDUCATION:  Outpatient Education  Individual(s) Educated: Patient, Spouse  Education Provided: Post-Op Precautions, Fall Risk  Education Comment: Pt able to recall 1 of 3 hip precautions.  Education of fall precautions and safety throughout treatment session.  Good verbalized understanding.    Encounter Problems       Encounter Problems (Active)       Mobility       STG - Patient will ambulate 250 feet with FWW independently (Progressing)       Start:  01/09/24    Expected End:  01/16/24            STG - Patient will ascend and descend four to six stairs while holding one rail independently (Progressing)       Start:  01/09/24    Expected End:  01/16/24               Safety       Goal 1: Pt will follow THR precautions independently (Progressing)       Start:  01/09/24    Expected End:  01/16/24               Transfers       STG - Transfer from bed to chair independently with FWW (Progressing)       Start:  01/09/24    Expected End:  01/16/24            STG - Patient to transfer to and from sit to supine independently (Progressing)       Start:  01/09/24    Expected End:  01/16/24            STG -  Patient will perform bed mobility independently (Progressing)       Start:  01/09/24    Expected End:  01/16/24

## 2024-01-10 NOTE — PROGRESS NOTES
Occupational Therapy    Evaluation/Treatment    Patient Name: Shagufta Crowley  MRN: [id number]  : 1960  Today's Date: 01/10/24  Time Calculation  Start Time: 1140  Stop Time: 1225  Time Calculation (min): 45 min       Assessment:  OT Assessment: Pt seen for OT eval and tx. Pt demonstrates with assist with mobility and ADLS., mild decrease endurance  Prognosis: Good  Evaluation/Treatment Tolerance: Patient tolerated treatment well  Medical Staff Made Aware: Yes  End of Session Communication: Bedside nurse  End of Session Patient Position: Up in chair, Alarm off, not on at start of session, Alarm off, caregiver present  Prognosis: Good  Evaluation/Treatment Tolerance: Patient tolerated treatment well  Medical Staff Made Aware: Yes  Plan:  Treatment Interventions: ADL retraining, Functional transfer training, Endurance training, Patient/family training, Equipment evaluation/education, Compensatory technique education  OT Frequency: 3 times per week  OT Discharge Recommendations: Low intensity level of continued care  Equipment Recommended upon Discharge: Wheeled walker  OT - OK to Discharge: Yes  Treatment Interventions: ADL retraining, Functional transfer training, Endurance training, Patient/family training, Equipment evaluation/education, Compensatory technique education    Subjective   Current Problem:  1. Osteoarthrosis, hip  aspirin 81 mg chewable tablet    traMADol (Ultram) 50 mg tablet    docusate sodium (Colace) 100 mg capsule    Referral to Home Health    oxyCODONE (Roxicodone) 5 mg immediate release tablet        General:   OT Received On: 01/10/24  General  Reason for Referral: L THR  Referred By: Delfino LÓPEZ)  Past Medical History Relevant to Rehab: HLD, HTN, obese, prediabetic  Family/Caregiver Present: Yes  Caregiver Feedback: Spouse supportive  Prior to Session Communication: Bedside nurse  Patient Position Received: Bed, 3 rail up, Alarm on  Preferred Learning Style: verbal, visual, written,  auditory  General Comment: pt agreeable to OT eval and tx  Precautions:  LE Weight Bearing Status: Weight Bearing as Tolerated  Medical Precautions: Fall precautions  Post-Surgical Precautions: Left hip precautions  Vital Signs:     Pain:  Pain Assessment  Pain Assessment: 0-10  Pain Score: 4  Pain Type: Surgical pain  Pain Location: Hip  Pain Orientation: Left  Pain Descriptors: Aching, Burning, Sore  Pain Frequency: Constant/continuous  Pain Onset: Ongoing  Pain Interventions: Cold applied, Repositioned, Ambulation/increased activity, Distraction    Objective   Cognition:  Overall Cognitive Status: Within Functional Limits  Orientation Level: Oriented X4  Following Commands: Follows all commands and directions without difficulty  Attention: Within Functional Limits  Memory: Within Funtional Limits  Problem Solving: Within Functional Limits  Safety/Judgement: Within Functional Limits  Insight: Within function limits           Home Living:  Type of Home: House  Lives With: Spouse  Home Adaptive Equipment: Walker rolling or standard, Reacher, Sock aid, Long-handled shoehorn, Long-handled sponge  Home Layout: One level  Home Access: Stairs to enter with rails  Entrance Stairs-Rails: Left  Entrance Stairs-Number of Steps: 3  Bathroom Shower/Tub: Walk-in shower, Tub/shower unit  Bathroom Toilet: Adaptive toilet seating  Bathroom Equipment: Built-in shower seat  Prior Function:  Level of [place]: Needs assistance with ADLs, Needs assistance with homemaking (per pt due to pain she was having  assisted)  Receives Help From: Family  ADL Assistance: Needs assistance  Bath: Minimal  Dressing: Minimal  Homemaking Assistance: Needs assistance  Meal Prep: Independent  Laundry: Moderate  Vacuuming: Maximal  Cleaning: Maximal  Hand Dominance: Right  Prior Function Comments: pt reports spouse assissting with ADLs and IADLS due to pain with mobility  IADL History:  IADL Comments: spouse assists  ADL:  Eating Assistance:  Independent  Grooming Assistance: Independent  Bathing Assistance: Moderate  Bathing Deficit: Right lower leg including foot, Left lower leg including foot  UE Dressing Assistance: Independent  LE Dressing Assistance: Moderate  LE Dressing Deficit: Thread LLE into underwear, Thread RLE into underwear, Thread LLE into pants, Thread RLE into pants, Don/doff L sock, Don/doff R sock, Tie shoes, Don/doff R shoe, Don/doff L shoe  Toileting Assistance with Device: Independent  Activities of Daily Living:    LE Bathing  LE Bathing Adaptive Equipment: Long-handled sponge  LE Bathing Level of Assistance: Setup, Modified independent  LE Bathing Where Assessed:  (simulated while sitting in chair)     LE Dressing  LE Dressing: Yes  Pants Level of Assistance: Minimum assistance, Minimal verbal cues, Tactile cues (instructed in use of and practiced with reachers to assist in donning and doffing pants)  Sock Level of Assistance: Minimum assistance, Minimal verbal cues, Tactile cues (pt instructed in and practices with sockaid to don socks and dressing stick to remove socks)  Shoe Level of Assistance: Minimum assistance, Minimal verbal cues, Tactile cues (instructed in use of and practiced with shoe hornto don shoes. Pt required assist with donning shoes. Spouse reports he will assist)  Activity Tolerance:  Endurance: Endurance does not limit participation in activity     Bed Mobility/Transfers: Bed Mobility  Bed Mobility: Yes  Bed Mobility 1  Bed Mobility 1: Supine to sitting  Level of Assistance 1: Minimum assistance  Bed Mobility Comments 1: HOB elevated    Transfers  Transfer: Yes  Transfer 1  Technique 1: Sit to stand, Stand to sit  Transfer Device 1: Walker  Transfer Level of Assistance 1: Close supervision  Trials/Comments 1: cues to push up from bed     Therapy/Activity: Therapeutic Activity  Therapeutic Activity Performed: Yes  Therapeutic Activity 1: pt educated to precautions, energy conservation techniques. Pt/spouse  stated understanding     Vision:Vision - Basic Assessment  Current Vision: No visual deficits  Sensation:  Light Touch: No apparent deficits  Strength:  Strength Comments: STAN WALKER  Coordination:  Movements are Fluid and Coordinated: Yes  Finger to Nose: Intact  Rapid Alternating Movements: Intact  Coordination Comment: STAN UFAREED WFL   Hand Function:  Hand Function  Gross Grasp: Functional  Coordination: Functional  Extremities: RUE   RUE : Within Functional Limits and LUE   LUE: Within Functional Limits    Outcome Measures: [facility] Daily Activity  Putting on and taking off regular lower body clothing: A little  Bathing (including washing, rinsing, drying): A little  Putting on and taking off regular upper body clothing: None  Toileting, which includes using toilet, bedpan or urinal: A little  Taking care of personal grooming such as brushing teeth: None  Eating Meals: None  Daily Activity - Total Score: 21        Education Documentation  Handouts, taught by Fatemeh Scruggs OT at 1/10/2024  2:36 PM.  Learner: Significant Other, Patient  Readiness: Acceptance  Method: Explanation, Demonstration, Handout, Teach-back  Response: Verbalizes Understanding, Demonstrated Understanding, Needs Reinforcement    Precautions, taught by Fatemeh Scruggs OT at 1/10/2024  2:36 PM.  Learner: Significant Other, Patient  Readiness: Acceptance  Method: Explanation, Demonstration, Handout, Teach-back  Response: Verbalizes Understanding, Demonstrated Understanding, Needs Reinforcement    ADL Training, taught by Fatemeh Scruggs OT at 1/10/2024  2:36 PM.  Learner: Significant Other, Patient  Readiness: Acceptance  Method: Explanation, Demonstration, Handout, Teach-back  Response: Verbalizes Understanding, Demonstrated Understanding, Needs Reinforcement    Education Comments  No comments found.        OP EDUCATION:  Education  Individual(s) Educated: Patient, Significant Other  Education Provided: Diagnosis & Precautions, Joint protection  and energy conservation, Fall precautons, Risk and benefits of OT discussed with patient or other, POC discussed and agreed upon  Home Program: Handout issued  Risk and Benefits Discussed with Patient/Caregiver/Other: yes  Patient/Caregiver Demonstrated Understanding: yes  Plan of Care Discussed and Agreed Upon: yes  Patient Response to Education: Patient/Caregiver Verbalized Understanding of Information, Patient/Caregiver Performed Return Demonstration of Exercises/Activities, Patient/Caregiver Asked Appropriate Questions    Goals:  Encounter Problems       Encounter Problems (Active)       ADLs       Patient will perform LB bathing 100% with modified independent level of assistance and adaptive equipment prn .       Start:  01/10/24    Expected End:  01/24/24            Patient with complete lower body dressing with modified independent level of assistance donning and doffing all LE clothes  with reacher, shoe horn, sock-aid, and dressing stick  while supported sitting       Start:  01/10/24    Expected End:  01/24/24               COGNITION/SAFETY       Patient will recall and adhere to hip precautions during all functional mobility/ADL tasks in order to demonstrate improved understanding and promote healing post op       Start:  01/10/24    Expected End:  01/24/24                   TRANSFERS       Patient will perform bed mobility modified independent level of assistance and bed rails in order to improve safety and independence with mobility       Start:  01/10/24    Expected End:  01/24/24

## 2024-01-11 ENCOUNTER — HOME CARE VISIT (OUTPATIENT)
Dept: HOME HEALTH SERVICES | Facility: HOME HEALTH | Age: 64
End: 2024-01-11
Payer: COMMERCIAL

## 2024-01-11 VITALS
DIASTOLIC BLOOD PRESSURE: 62 MMHG | HEART RATE: 100 BPM | OXYGEN SATURATION: 96 % | TEMPERATURE: 98.1 F | SYSTOLIC BLOOD PRESSURE: 118 MMHG

## 2024-01-11 PROCEDURE — G0151 HHCP-SERV OF PT,EA 15 MIN: HCPCS

## 2024-01-11 PROCEDURE — 0023 HH SOC

## 2024-01-11 ASSESSMENT — ACTIVITIES OF DAILY LIVING (ADL)
ENTERING_EXITING_HOME: NEEDS ASSISTANCE
OASIS_M1830: 03

## 2024-01-11 ASSESSMENT — ENCOUNTER SYMPTOMS
PAIN: 1
PAIN LOCATION - PAIN SEVERITY: 9/10
LOWEST PAIN SEVERITY IN PAST 24 HOURS: 7/10
HIGHEST PAIN SEVERITY IN PAST 24 HOURS: 9/10
PAIN LOCATION: LEFT HIP
PERSON REPORTING PAIN: PATIENT

## 2024-01-15 ENCOUNTER — HOME CARE VISIT (OUTPATIENT)
Dept: HOME HEALTH SERVICES | Facility: HOME HEALTH | Age: 64
End: 2024-01-15
Payer: COMMERCIAL

## 2024-01-15 VITALS
HEART RATE: 98 BPM | DIASTOLIC BLOOD PRESSURE: 88 MMHG | RESPIRATION RATE: 18 BRPM | SYSTOLIC BLOOD PRESSURE: 124 MMHG | TEMPERATURE: 97.4 F

## 2024-01-15 PROCEDURE — G0157 HHC PT ASSISTANT EA 15: HCPCS

## 2024-01-15 ASSESSMENT — ENCOUNTER SYMPTOMS
LOWEST PAIN SEVERITY IN PAST 24 HOURS: 5/10
PAIN: 1
HIGHEST PAIN SEVERITY IN PAST 24 HOURS: 6/10
PERSON REPORTING PAIN: PATIENT

## 2024-01-17 ENCOUNTER — HOME CARE VISIT (OUTPATIENT)
Dept: HOME HEALTH SERVICES | Facility: HOME HEALTH | Age: 64
End: 2024-01-17
Payer: COMMERCIAL

## 2024-01-17 VITALS
DIASTOLIC BLOOD PRESSURE: 84 MMHG | HEART RATE: 90 BPM | TEMPERATURE: 98 F | SYSTOLIC BLOOD PRESSURE: 134 MMHG | RESPIRATION RATE: 18 BRPM

## 2024-01-17 PROCEDURE — G0157 HHC PT ASSISTANT EA 15: HCPCS

## 2024-01-22 ENCOUNTER — HOME CARE VISIT (OUTPATIENT)
Dept: HOME HEALTH SERVICES | Facility: HOME HEALTH | Age: 64
End: 2024-01-22
Payer: COMMERCIAL

## 2024-01-22 PROCEDURE — G0157 HHC PT ASSISTANT EA 15: HCPCS

## 2024-01-24 ENCOUNTER — HOME CARE VISIT (OUTPATIENT)
Dept: HOME HEALTH SERVICES | Facility: HOME HEALTH | Age: 64
End: 2024-01-24
Payer: COMMERCIAL

## 2024-01-24 ENCOUNTER — APPOINTMENT (OUTPATIENT)
Dept: PODIATRY | Facility: CLINIC | Age: 64
End: 2024-01-24
Payer: COMMERCIAL

## 2024-01-24 VITALS — SYSTOLIC BLOOD PRESSURE: 116 MMHG | HEART RATE: 84 BPM | TEMPERATURE: 98 F | DIASTOLIC BLOOD PRESSURE: 84 MMHG

## 2024-01-24 PROCEDURE — G0157 HHC PT ASSISTANT EA 15: HCPCS

## 2024-01-24 ASSESSMENT — ENCOUNTER SYMPTOMS
HIGHEST PAIN SEVERITY IN PAST 24 HOURS: 4/10
PERSON REPORTING PAIN: PATIENT
LOWEST PAIN SEVERITY IN PAST 24 HOURS: 0/10
PAIN: 1

## 2024-01-29 ENCOUNTER — HOSPITAL ENCOUNTER (OUTPATIENT)
Dept: RADIOLOGY | Facility: CLINIC | Age: 64
Discharge: HOME | End: 2024-01-29
Payer: COMMERCIAL

## 2024-01-29 ENCOUNTER — OFFICE VISIT (OUTPATIENT)
Dept: ORTHOPEDIC SURGERY | Facility: CLINIC | Age: 64
End: 2024-01-29
Payer: COMMERCIAL

## 2024-01-29 ENCOUNTER — HOME CARE VISIT (OUTPATIENT)
Dept: HOME HEALTH SERVICES | Facility: HOME HEALTH | Age: 64
End: 2024-01-29
Payer: COMMERCIAL

## 2024-01-29 VITALS
HEART RATE: 90 BPM | DIASTOLIC BLOOD PRESSURE: 80 MMHG | TEMPERATURE: 97.5 F | SYSTOLIC BLOOD PRESSURE: 120 MMHG | RESPIRATION RATE: 18 BRPM

## 2024-01-29 DIAGNOSIS — Z96.642 STATUS POST LEFT HIP REPLACEMENT: ICD-10-CM

## 2024-01-29 PROCEDURE — 99024 POSTOP FOLLOW-UP VISIT: CPT | Performed by: ORTHOPAEDIC SURGERY

## 2024-01-29 PROCEDURE — 1036F TOBACCO NON-USER: CPT | Performed by: ORTHOPAEDIC SURGERY

## 2024-01-29 PROCEDURE — 73502 X-RAY EXAM HIP UNI 2-3 VIEWS: CPT | Mod: LEFT SIDE | Performed by: RADIOLOGY

## 2024-01-29 PROCEDURE — 73502 X-RAY EXAM HIP UNI 2-3 VIEWS: CPT | Mod: LT

## 2024-01-29 PROCEDURE — G0157 HHC PT ASSISTANT EA 15: HCPCS

## 2024-01-29 ASSESSMENT — ENCOUNTER SYMPTOMS
PERSON REPORTING PAIN: PATIENT
HIGHEST PAIN SEVERITY IN PAST 24 HOURS: 1/10
LOWEST PAIN SEVERITY IN PAST 24 HOURS: 0/10
PAIN: 1

## 2024-01-29 ASSESSMENT — PAIN DESCRIPTION - DESCRIPTORS: DESCRIPTORS: TIGHTNESS

## 2024-01-29 ASSESSMENT — PAIN - FUNCTIONAL ASSESSMENT: PAIN_FUNCTIONAL_ASSESSMENT: 0-10

## 2024-01-29 ASSESSMENT — PAIN SCALES - GENERAL: PAINLEVEL_OUTOF10: 3

## 2024-01-29 NOTE — PROGRESS NOTES
S: Pain well controlled.  Ready for outpatient therapy.  Not having a lot of pain.  Gets some achiness around the hip.      O: Incisions healing nicely.  No pain with logroll.  Some weakness with resisted hip flexion abduction.    XR: I personally reviewed the following radiographic exams: AP pelvis and left hip shows well-fixed well aligned total hip.    A: S/P left total hip replacement.    P: Outpatient therapy prescription given.  Reevaluate in 3 weeks.  Tentative return to work February 22.

## 2024-02-02 ENCOUNTER — HOME CARE VISIT (OUTPATIENT)
Dept: HOME HEALTH SERVICES | Facility: HOME HEALTH | Age: 64
End: 2024-02-02
Payer: COMMERCIAL

## 2024-02-02 VITALS — OXYGEN SATURATION: 100 % | HEART RATE: 82 BPM | TEMPERATURE: 98.5 F

## 2024-02-02 PROCEDURE — G0151 HHCP-SERV OF PT,EA 15 MIN: HCPCS

## 2024-02-02 ASSESSMENT — ENCOUNTER SYMPTOMS
LOWEST PAIN SEVERITY IN PAST 24 HOURS: 0/10
PAIN LOCATION - PAIN SEVERITY: 2/10
PAIN: 1
PAIN LOCATION: LEFT HIP
HIGHEST PAIN SEVERITY IN PAST 24 HOURS: 2/10
PERSON REPORTING PAIN: PATIENT

## 2024-02-02 ASSESSMENT — ACTIVITIES OF DAILY LIVING (ADL)
HOME_HEALTH_OASIS: 01
OASIS_M1830: 03

## 2024-02-07 ENCOUNTER — EVALUATION (OUTPATIENT)
Dept: PHYSICAL THERAPY | Facility: CLINIC | Age: 64
End: 2024-02-07
Payer: COMMERCIAL

## 2024-02-07 DIAGNOSIS — Z96.642 STATUS POST LEFT HIP REPLACEMENT: ICD-10-CM

## 2024-02-07 PROCEDURE — 97161 PT EVAL LOW COMPLEX 20 MIN: CPT | Mod: GP | Performed by: PHYSICAL THERAPIST

## 2024-02-07 PROCEDURE — 97140 MANUAL THERAPY 1/> REGIONS: CPT | Mod: GP | Performed by: PHYSICAL THERAPIST

## 2024-02-07 PROCEDURE — 97110 THERAPEUTIC EXERCISES: CPT | Mod: GP | Performed by: PHYSICAL THERAPIST

## 2024-02-07 ASSESSMENT — PAIN DESCRIPTION - DESCRIPTORS: DESCRIPTORS: TIGHTNESS

## 2024-02-07 ASSESSMENT — COLUMBIA-SUICIDE SEVERITY RATING SCALE - C-SSRS
1. IN THE PAST MONTH, HAVE YOU WISHED YOU WERE DEAD OR WISHED YOU COULD GO TO SLEEP AND NOT WAKE UP?: NO
6. HAVE YOU EVER DONE ANYTHING, STARTED TO DO ANYTHING, OR PREPARED TO DO ANYTHING TO END YOUR LIFE?: NO
2. HAVE YOU ACTUALLY HAD ANY THOUGHTS OF KILLING YOURSELF?: NO

## 2024-02-07 ASSESSMENT — PAIN SCALES - GENERAL: PAINLEVEL_OUTOF10: 1

## 2024-02-07 ASSESSMENT — ENCOUNTER SYMPTOMS
DEPRESSION: 0
LOSS OF SENSATION IN FEET: 0
OCCASIONAL FEELINGS OF UNSTEADINESS: 1

## 2024-02-07 ASSESSMENT — PATIENT HEALTH QUESTIONNAIRE - PHQ9
1. LITTLE INTEREST OR PLEASURE IN DOING THINGS: NOT AT ALL
SUM OF ALL RESPONSES TO PHQ9 QUESTIONS 1 AND 2: 0
2. FEELING DOWN, DEPRESSED OR HOPELESS: NOT AT ALL

## 2024-02-07 NOTE — PROGRESS NOTES
Physical Therapy  Physical Therapy Orthopedic Evaluation    Patient Name: Shagufta Crowley  MRN: [id number]  Today's Date: 2/7/2024  Time Calculation  Start Time: 0933  Stop Time: 1015  Time Calculation (min): 42 min    Insurance:  Payor: HARDIK / Plan: [employer] HMP / Product Type: *No Product type* /   Number of Treatments Authorized: 1/50          Current Problem  1. Status post left hip replacement  Referral to Physical Therapy    Follow Up In Physical Therapy          General:  General  Reason for Referral: L LORETO posterior lateral approach 1/09/2024  Referred By: Dr. Saleh  Past Medical History Relevant to Rehab: Prior L ankle fracture with chronic intoeing  General Comment: Patient states that she has been doing home therapy. Notes that she tries to do her exercises 2 times a day and uses the walker mostly at home. Over the past couple days the outside of her hip has increased in soreness as well as the inside of her L knee. Just started using a cane though uses the walker mostly at home.      Precautions:   Precautions  STEADI Fall Risk Score (The score of 4 or more indicates an increased risk of falling): 3  Post-Surgical Precautions: Left hip precautions (Posterior lateral approach)  Precautions Comment: Moderate fall risk    Medical History Form: Reviewed (scanned into chart)    Subjective:   Subjective     Pain:  Pain Score: 1  Pain Location: Hip  Pain Orientation: Left  Pain Descriptors: Tightness  Pain Frequency: Constant/continuous  Patient's Stated Pain Goal: No pain  Pain Interventions: Medication (See MAR), Cold applied    Relevant Information (PMH & Previous Tests/Imaging): X-Ray  Previous Interventions/Treatments: Physical Therapy    Prior Level of Function (PLOF)  Prior Function Per Pt/Caregiver Report  Level of [place]: Independent with homemaking with ambulation, Needs assistance with ADLs  Receives Help From: Family  Vocational: Full time employment  Leisure: Walking  Hand Dominance:  Right  Patient previously independent with all ADLs    Patients Living Environment:   Home Living Comment: First floor ranch with basement    Primary Language: English    Patient's Goal(s) for Therapy: Strength and control    Red Flags: Do you have any of the following? No  Fever/chills, unexplained weight changes, dizziness/fainting, unexplained change in bowel or bladder functions, unexplained malaise or muscle weakness, night pain/sweats, numbness or tingling    Objective     HIP    Hip Observation  Observation Comment: Distal LE intoeing. Arrives with  in wheelchair, ambulated short distance into clinic due to fatigue.    Hip Palpation/Joint Mobility Assessment  Palpation / Joint Mobility Comment: TTP L greater trochanter, ER and gluteus medius    Hip AROM  R hip flexion: (125°): 80  L hip flexion: (125°): 75  R hip ER: (45°): 50  L hip ER: (45°): 21  R hip IR: (45°): 30  L hip IR: (45°): NT due to post operative precautions  Hip PROM  R hip flexion: (125°): 95  L hip flexion: (125°): 80  R hip ER: (45°): 50  L hip ER: (45°): 25  R hip IR: (45°): 30  L hip IR: (45°): Neutral  Specific Lower Extremity MMT   R Iliopsoas: (5/5): 4+/5  L Iliopsoas: (5/5): 4-/5  R Gluteals (prone): (5/5): 4+/5  L Gluteals (prone): (5/5): 4-/5  R Gluteals (sidelying): (5/5): 4/5  L Gluteals (sidelying): (5/5): 3+/5  R Hip External Rotation: (5/5): 4+/5  L Hip External Rotation: (5/5): 4-/5  R knee flexion: (5/5): 4+/5  L knee flexion: (5/5): 4/5  R knee extension: (5/5): 4+/5  L knee extension: (5/5): 4-/5  Special Tests  Other: NT due to post operative status    Gait  Gait Comment: Cane in R UE. Antalgic gait pattern with reduced L stance time. reduced hip flexion in mid swing on L.    Outcome Measures:    Other Measures  Lower Extremity Funtional Score (LEFS): 31/80    EDUCATION: home exercise program, plan of care, activity modifications, pain management, and injury pathology  Outpatient Education  Individual(s) Educated:  Patient, Spouse  Education Provided: Anatomy, Home Exercise Program, Fall Risk, POC, Post-Op Precautions (Scar Desensitization techniques)  Risk and Benefits Discussed with Patient/Caregiver/Other: yes  Patient/Caregiver Demonstrated Understanding: yes  Plan of Care Discussed and Agreed Upon: yes  Patient Response to Education: Patient/Caregiver Verbalized Understanding of Information, Patient/Caregiver Performed Return Demonstration of Exercises/Activities, Patient/Caregiver Asked Appropriate Questions  Education Comment: Access Code: [email or web address]  URL: [email or web address]  Date: 02/07/2024  Prepared by: Morteza Vivas    Exercises  - Supine Bridge with Resistance Band  - 1 x daily - 7 x weekly - 3 sets - 10 reps  - Bent Knee Fallouts  - 1 x daily - 7 x weekly - 3 sets - 10 reps  - Hooklying Single Leg Bent Knee Fallouts with Resistance  - 1 x daily - 7 x weekly - 3 sets - 10 reps    Assessment:  PT Assessment Results: Decreased strength, Decreased range of motion, Decreased endurance, Impaired balance, Decreased mobility, Pain  Assessment Comment: Patient is a 63-year-old female presents to physical therapy with signs symptoms consistent with left hip pain status post posterior lateral total hip arthroplasty.  Patient presents with limited range of motion, impaired gait pattern, reduced lower extremity strength as well as increased pain.  These are preventing her from completing ADLs independently, navigating stairs and uneven surfaces as well as returning to work responsibilities.  Therefore she will require skilled physical therapy in order to address stated deficits for full return to pain reduced function.    Plan:  Treatment/Interventions: Cryotherapy, Education/ Instruction, Electrical stimulation, Gait training, Manual therapy, Neuromuscular re-education, Self care/ home management, Therapeutic activities, Therapeutic exercises  PT Plan: Skilled PT  PT Frequency: 2 times per  week  Duration: 8 weeks  Onset Date: 01/09/24  Number of Treatments Authorized: 1/50  Rehab Potential: Good  Plan of Care Agreement: Patient    Goals: Set and discussed today  Active       PT Problem       PT Goal 1       Start:  02/07/24    Expected End:  05/01/24       STG  1) Patient will be able to complete all normal activities with pain no greater than 1 /10 in 6 weeks.  2) Patient will improve their left hip flexion and ER ROM by 10 degrees in order to allow for great ease with transfers as well as donning and doffing socks without assistive devices in 6 weeks.  3) Patient will be independent with HEP in 3 visits to allow for continued improvement in daily tasks at home and in the community.    LTG  1) Patient will improve LEFS to 50 /80 in order to allow for greater completion of functional activities at home and in the community in 10 weeks.  2) Patient will have 4+ /5 strength in lateral hip stabilizers to prevent any descending compensations required for proper gait mechanics in 8 weeks.  3) Patient will be able to perform proper squatting technique in 6 weeks in order prevent increased pain with ADLs.            Patient Stated Goal 1       Start:  02/07/24    Expected End:  05/01/24       Strength and control             Plan of care was developed with input and agreement by the patient    Treatments:  Therapeutic Exercise  Therapeutic Exercise Performed: Yes  Therapeutic Exercise Activity 1: See HEP  Therapeutic Exercise Activity 2: PROM ER, flexion and abduction of L LE    Manual Therapy  Manual Therapy Performed: Yes  Manual Therapy Activity 1: STM: L lateral and posterior hip in R SL roll between legs      Morteza Vivas, PT

## 2024-02-11 NOTE — PROGRESS NOTES
"  Physical Therapy Treatment    Patient Name: Shagufta Crowley  MRN: [id number]  Today's Date: 2/12/2024  Time Calculation  Start Time: 1603  Stop Time: 1648  Time Calculation (min): 45 min  TE 31 min   Man 11 min    Current Problem  1. Status post left hip replacement  Follow Up In Physical Therapy          Insurance:  Number of Treatments Authorized: 2/50          Subjective   General  Reason for Referral: L LORETO posterior lateral approach 1/09/2024  Referred By: Dr. Saleh  Past Medical History Relevant to Rehab: Prior L ankle fracture with chronic intoeing  General Comment: Pt reports she's been doing exercises consistently. Sore today because she did the basement stairs yesterday and stood to cook a little bit for the superbowl.      Performing HEP?: Yes    Precautions  Precautions  STEADI Fall Risk Score (The score of 4 or more indicates an increased risk of falling): 3  Post-Surgical Precautions: Left hip precautions (Posterior lateral approach)  Precautions Comment: Moderate fall risk  Pain  Pain Score: 3  Pain Location: Hip  Pain Orientation: Left  Pain Descriptors: Tightness    Objective   Ambulating with SP cane with slow gait  (+) rectus tenderness left     Treatments:    Therapeutic Exercise  Therapeutic Exercise Performed: Yes  Therapeutic Exercise Activity 1: Ambulating with SP cane from door to lockers SBA  Therapeutic Exercise Activity 2: Seated hip adduction with yellow ball 3\" x 2 min  Therapeutic Exercise Activity 3: Seated hip ER red tband 3\" x 1 min  Therapeutic Exercise Activity 4: Ambulating 40' to // bars with SP cane - cues for heel/toe pattern  Therapeutic Exercise Activity 5: Heel raises x 1 min  Therapeutic Exercise Activity 6: R/L alt hip extension with UE support x 1 min  Therapeutic Exercise Activity 7: Mini squats with UE support x 1 min  Therapeutic Exercise Activity 8: R/L alt LE tap to 8\" step x 1 min  Therapeutic Exercise Activity 9: Ambulating with SP cane x 70'  Therapeutic " Exercise Activity 10: Sit<>stand from elevated plinth without UE support x 8 reps  Therapeutic Exercise Activity 11: L QS + SLR small range x 1 min  Therapeutic Exercise Activity 12: PROM L hip within precautions         Manual Therapy  Manual Therapy Activity 1: Supine STM L anterior hip at rectus and quad, L lateral hip, L adductor release                   OP EDUCATION:  Outpatient Education  Individual(s) Educated: Patient, Spouse  Education Provided: Anatomy  Education Comment: Continue current HEP  Educated spouse on STM to perform     Assessment:  PT Assessment  Assessment Comment: Pt tolerated session well ambulating with SP cane, however rest breaks needed. Limited standing endurance and fatiguing throughout. Challenged with sit<>stands without UE support and small range SLR. TP at L adductor and rectus femoris - improving with STM, warned on post tx soreness.    Plan:  OP PT Plan  PT Plan: Skilled PT (Gait with SP cane, hip strengthening with standing exercises, step ups, hip PROM/AROM, STM prn)  PT Frequency: 2 times per week  Duration: 8 weeks  Onset Date: 01/09/24  Number of Treatments Authorized: 2/50  Rehab Potential: Good    Goals:  Active       PT Problem       PT Goal 1       Start:  02/07/24    Expected End:  05/01/24       STG  1) Patient will be able to complete all normal activities with pain no greater than 1 /10 in 6 weeks.  2) Patient will improve their left hip flexion and ER ROM by 10 degrees in order to allow for great ease with transfers as well as donning and doffing socks without assistive devices in 6 weeks.  3) Patient will be independent with HEP in 3 visits to allow for continued improvement in daily tasks at home and in the community.    LTG  1) Patient will improve LEFS to 50 /80 in order to allow for greater completion of functional activities at home and in the community in 10 weeks.  2) Patient will have 4+ /5 strength in lateral hip stabilizers to prevent any descending  compensations required for proper gait mechanics in 8 weeks.  3) Patient will be able to perform proper squatting technique in 6 weeks in order prevent increased pain with ADLs.            Patient Stated Goal 1       Start:  02/07/24    Expected End:  05/01/24       Strength and control              Leslie Peña, PT

## 2024-02-12 ENCOUNTER — TREATMENT (OUTPATIENT)
Dept: PHYSICAL THERAPY | Facility: CLINIC | Age: 64
End: 2024-02-12
Payer: COMMERCIAL

## 2024-02-12 DIAGNOSIS — Z96.642 STATUS POST LEFT HIP REPLACEMENT: Primary | ICD-10-CM

## 2024-02-12 PROCEDURE — 97110 THERAPEUTIC EXERCISES: CPT | Mod: GP | Performed by: PHYSICAL THERAPIST

## 2024-02-12 PROCEDURE — 97140 MANUAL THERAPY 1/> REGIONS: CPT | Mod: GP | Performed by: PHYSICAL THERAPIST

## 2024-02-12 ASSESSMENT — PAIN SCALES - GENERAL: PAINLEVEL_OUTOF10: 3

## 2024-02-12 ASSESSMENT — PAIN DESCRIPTION - DESCRIPTORS: DESCRIPTORS: TIGHTNESS

## 2024-02-13 ENCOUNTER — APPOINTMENT (OUTPATIENT)
Dept: PHYSICAL THERAPY | Facility: CLINIC | Age: 64
End: 2024-02-13
Payer: COMMERCIAL

## 2024-02-15 ENCOUNTER — APPOINTMENT (OUTPATIENT)
Dept: PHYSICAL THERAPY | Facility: CLINIC | Age: 64
End: 2024-02-15
Payer: COMMERCIAL

## 2024-02-16 ENCOUNTER — TREATMENT (OUTPATIENT)
Dept: PHYSICAL THERAPY | Facility: CLINIC | Age: 64
End: 2024-02-16
Payer: COMMERCIAL

## 2024-02-16 DIAGNOSIS — Z96.642 STATUS POST LEFT HIP REPLACEMENT: ICD-10-CM

## 2024-02-16 PROCEDURE — 97110 THERAPEUTIC EXERCISES: CPT | Mod: GP | Performed by: PHYSICAL THERAPIST

## 2024-02-16 PROCEDURE — 97140 MANUAL THERAPY 1/> REGIONS: CPT | Mod: GP | Performed by: PHYSICAL THERAPIST

## 2024-02-16 ASSESSMENT — PAIN SCALES - GENERAL: PAINLEVEL_OUTOF10: 6

## 2024-02-16 ASSESSMENT — PAIN DESCRIPTION - DESCRIPTORS: DESCRIPTORS: TIGHTNESS;THROBBING

## 2024-02-17 NOTE — PROGRESS NOTES
"  Physical Therapy Treatment    Patient Name: Shagufta Crowley  MRN: [id number]  Today's Date: 2/17/2024  Time Calculation  Start Time: 1300  Stop Time: 1346  Time Calculation (min): 46 min  PT Therapeutic Procedures Time Entry  Manual Therapy Time Entry: 10  Therapeutic Exercise Time Entry: 34,      Current Problem  1. Status post left hip replacement  Follow Up In Physical Therapy            Insurance:  Payor: HARDIK / Plan: ANTHEM HMP / Product Type: *No Product type* /   Number of Treatments Authorized: 3/50          Subjective   General  Reason for Referral: L LORETO posterior lateral approach 1/09/2024  Referred By: Dr. Saleh  Past Medical History Relevant to Rehab: Prior L ankle fracture with chronic intoeing  General Comment: Patient states that she went to the [facility] with her  and sat for 1.5 hours and then stood in line following. Notes following, she has had a lot of stiffness and discomfort in her hip and tailbone.    Performing HEP?: Yes    Precautions  Precautions  STEADI Fall Risk Score (The score of 4 or more indicates an increased risk of falling): 3  Post-Surgical Precautions: Left hip precautions (Posterior lateral approach)  Precautions Comment: Moderate fall risk  Pain  Pain Score: 6  Pain Location: Hip  Pain Orientation: Left  Pain Descriptors: Tightness, Throbbing    Objective   SBA with cane    Treatments:    Therapeutic Exercise  Therapeutic Exercise Activity 1: Ambulating with SP cane from door to bike SBA  Therapeutic Exercise Activity 2: Scifit lvl 1 x 5 min  Therapeutic Exercise Activity 3: Standing gastroc stretch x 1 min  Therapeutic Exercise Activity 4: Standing HS stretch on step x 1 min ea  Therapeutic Exercise Activity 5: Heel raises x 1 min  Therapeutic Exercise Activity 6: R/L alt LE tap to 8\" step x 15 ea (R under step)  Therapeutic Exercise Activity 7: Side stepping at // bar yellow loop x 6 ea direction  Therapeutic Exercise Activity 8: Standing hip extension " taps x 10 ea LE  Therapeutic Exercise Activity 9: PROM L hip within precautions         Manual Therapy  Manual Therapy Activity 1: Supine STM L anterior hip at rectus and quad; R SL with pillow between LE L lateral hip, L abductors release    OP EDUCATION:  Outpatient Education  Education Comment: Continue HEP and increase walking at home    Assessment:  PT Assessment  PT Assessment Results: Decreased strength, Decreased range of motion, Pain, Decreased mobility  Assessment Comment: Patient with increased ambulation tolerance this session with the ability to make it from from the clinic to bikes with minimal breaks.  Adjusted cane to 1 lower setting for greater stability with upper extremity.  Discussed with patient and  effects of prolonged standing and mobility and body's response especially with muscles that are weak.  Overall patient progressing well and did educate on continued mobility at home for reduction in lower extremity soreness and stiffness.    Plan:  OP PT Plan  PT Plan: Skilled PT (Gait with SP cane, hip strengthening with standing exercises, step ups, hip PROM/AROM, STM prn)  PT Frequency: 2 times per week  Duration: 8 weeks  Onset Date: 01/09/24  Number of Treatments Authorized: 3/50  Rehab Potential: Good    Goals:  Active       PT Problem       PT Goal 1       Start:  02/07/24    Expected End:  05/01/24       STG  1) Patient will be able to complete all normal activities with pain no greater than 1 /10 in 6 weeks.  2) Patient will improve their left hip flexion and ER ROM by 10 degrees in order to allow for great ease with transfers as well as donning and doffing socks without assistive devices in 6 weeks.  3) Patient will be independent with HEP in 3 visits to allow for continued improvement in daily tasks at home and in the community.    LTG  1) Patient will improve LEFS to 50 /80 in order to allow for greater completion of functional activities at home and in the community in 10  weeks.  2) Patient will have 4+ /5 strength in lateral hip stabilizers to prevent any descending compensations required for proper gait mechanics in 8 weeks.  3) Patient will be able to perform proper squatting technique in 6 weeks in order prevent increased pain with ADLs.            Patient Stated Goal 1       Start:  02/07/24    Expected End:  05/01/24       Strength and control              Morteza Vivas, PT

## 2024-02-19 ENCOUNTER — TREATMENT (OUTPATIENT)
Dept: PHYSICAL THERAPY | Facility: CLINIC | Age: 64
End: 2024-02-19
Payer: COMMERCIAL

## 2024-02-19 ENCOUNTER — OFFICE VISIT (OUTPATIENT)
Dept: ORTHOPEDIC SURGERY | Facility: CLINIC | Age: 64
End: 2024-02-19
Payer: COMMERCIAL

## 2024-02-19 VITALS — WEIGHT: 217 LBS | BODY MASS INDEX: 39.93 KG/M2 | HEIGHT: 62 IN

## 2024-02-19 DIAGNOSIS — Z96.642 STATUS POST LEFT HIP REPLACEMENT: ICD-10-CM

## 2024-02-19 DIAGNOSIS — Z96.642 STATUS POST LEFT HIP REPLACEMENT: Primary | ICD-10-CM

## 2024-02-19 PROCEDURE — 1036F TOBACCO NON-USER: CPT | Performed by: ORTHOPAEDIC SURGERY

## 2024-02-19 PROCEDURE — 97110 THERAPEUTIC EXERCISES: CPT | Mod: GP | Performed by: PHYSICAL THERAPIST

## 2024-02-19 PROCEDURE — 99024 POSTOP FOLLOW-UP VISIT: CPT | Performed by: ORTHOPAEDIC SURGERY

## 2024-02-19 PROCEDURE — 97140 MANUAL THERAPY 1/> REGIONS: CPT | Mod: GP | Performed by: PHYSICAL THERAPIST

## 2024-02-19 ASSESSMENT — PAIN DESCRIPTION - DESCRIPTORS: DESCRIPTORS: TIGHTNESS;THROBBING

## 2024-02-19 ASSESSMENT — PAIN SCALES - GENERAL: PAINLEVEL_OUTOF10: 3

## 2024-02-19 NOTE — PROGRESS NOTES
S: Pain well controlled.  Still working with therapy.  They recommend additional therapy to work on strength and endurance.  Not ready for work.    O: Incisions healing nicely. Good alignment.  Weak flexion and abduction without pain.    A: S/P left total hip.    P: Continue with outpatient therapy for strength and endurance.  Reevaluate x-ray left hip in 1 month.

## 2024-02-19 NOTE — PROGRESS NOTES
"  Physical Therapy Treatment    Patient Name: Shagufta Crowley  MRN: [id number]  Today's Date: 2/19/2024  Time Calculation  Start Time: 1315  Stop Time: 1403  Time Calculation (min): 48 min  PT Therapeutic Procedures Time Entry  Manual Therapy Time Entry: 12  Therapeutic Exercise Time Entry: 34,      Current Problem  1. Status post left hip replacement  Follow Up In Physical Therapy            Insurance:  Payor: ANTHEM / Plan: ANTHEM HMP / Product Type: *No Product type* /   Number of Treatments Authorized: 4/50          Subjective   General  Reason for Referral: L LORETO posterior lateral approach 1/09/2024  Referred By: Dr. Saleh  Past Medical History Relevant to Rehab: Prior L ankle fracture with chronic intoeing  General Comment: Patient states that she felt a little better as the weekend progressed. Notes seeing the surgeon today who stated he is agreeing with delaying return to work.    Performing HEP?: Yes    Precautions  Precautions  STEADI Fall Risk Score (The score of 4 or more indicates an increased risk of falling): 3  Post-Surgical Precautions: Left hip precautions (Posterior lateral approach)  Precautions Comment: Moderate fall risk  Pain  Pain Score: 3  Pain Location: Hip  Pain Orientation: Left  Pain Descriptors: Tightness, Throbbing    Objective   TTP L medial patella    Treatments:    Therapeutic Exercise  Therapeutic Exercise Activity 1: Ambulating with SP cane from door to bike SBA  Therapeutic Exercise Activity 2: Scifit lvl 1 x 6 min  Therapeutic Exercise Activity 3: Standing gastroc stretch x 1 min  Therapeutic Exercise Activity 4: Standing HS stretch on step x 1 min ea  Therapeutic Exercise Activity 5: Side stepping at // bar yellow loop x 6 ea direction  Therapeutic Exercise Activity 6: Standing hip extension taps 2 x 10 ea LE  Therapeutic Exercise Activity 7: Sit to stand 1 pad 6# MB 2 x 10  Therapeutic Exercise Activity 8: 4\" fwd step up x 10 ea LE  Therapeutic Exercise Activity 9: 4\" " lateral step up and over x 10 ea  Therapeutic Exercise Activity 10: PROM L hip within precautions         Manual Therapy  Manual Therapy Activity 1: Supine STM L anterior hip at rectus and quad; R SL with pillow between LE L lateral hip, L abductors release      OP EDUCATION:  Outpatient Education  Education Comment: Access Code: [email or web address]  URL: [email or web address]  Date: 02/19/2024  Prepared by: Morteza Vivas    Exercises  - Side Stepping with Resistance at Ankles and Counter Support  - 1 x daily - 7 x weekly - 3 sets - 10 reps  - Standing Hip Extension with Resistance at Ankles and Counter Support  - 1 x daily - 7 x weekly - 3 sets - 10 reps  - Sit to Stand  - 1 x daily - 7 x weekly - 3 sets - 10 reps    Assessment:  PT Assessment  PT Assessment Results: Decreased strength, Decreased range of motion  Assessment Comment: Patient with increased medial left patellar tenderness following standing exercise. Sit to stands for quadricep strengthening good result from patient.  Patient also able to step up with left lower extremity leading.  Overall patient progressing well towards goals and progressed home exercise program in order to promote greater strengthening in the frontal plane.    Plan:  OP PT Plan  PT Plan: Skilled PT (Gait with SP cane, hip strengthening with standing exercises, step ups, hip PROM/AROM, STM prn)  PT Frequency: 2 times per week  Duration: 8 weeks  Onset Date: 01/09/24  Number of Treatments Authorized: 4/50  Rehab Potential: Good    Goals:  Active       PT Problem       PT Goal 1       Start:  02/07/24    Expected End:  05/01/24       STG  1) Patient will be able to complete all normal activities with pain no greater than 1 /10 in 6 weeks.  2) Patient will improve their left hip flexion and ER ROM by 10 degrees in order to allow for great ease with transfers as well as donning and doffing socks without assistive devices in 6 weeks.  3) Patient will be independent with HEP  in 3 visits to allow for continued improvement in daily tasks at home and in the community.    LTG  1) Patient will improve LEFS to 50 /80 in order to allow for greater completion of functional activities at home and in the community in 10 weeks.  2) Patient will have 4+ /5 strength in lateral hip stabilizers to prevent any descending compensations required for proper gait mechanics in 8 weeks.  3) Patient will be able to perform proper squatting technique in 6 weeks in order prevent increased pain with ADLs.            Patient Stated Goal 1       Start:  02/07/24    Expected End:  05/01/24       Strength and control              Morteza Vivas, PT

## 2024-02-22 ENCOUNTER — APPOINTMENT (OUTPATIENT)
Dept: PHYSICAL THERAPY | Facility: CLINIC | Age: 64
End: 2024-02-22
Payer: COMMERCIAL

## 2024-02-23 ENCOUNTER — TREATMENT (OUTPATIENT)
Dept: PHYSICAL THERAPY | Facility: CLINIC | Age: 64
End: 2024-02-23
Payer: COMMERCIAL

## 2024-02-23 DIAGNOSIS — Z96.642 STATUS POST LEFT HIP REPLACEMENT: ICD-10-CM

## 2024-02-23 PROCEDURE — 97140 MANUAL THERAPY 1/> REGIONS: CPT | Mod: GP | Performed by: PHYSICAL THERAPIST

## 2024-02-23 PROCEDURE — 97110 THERAPEUTIC EXERCISES: CPT | Mod: GP | Performed by: PHYSICAL THERAPIST

## 2024-02-23 ASSESSMENT — PAIN DESCRIPTION - DESCRIPTORS: DESCRIPTORS: THROBBING

## 2024-02-23 ASSESSMENT — PAIN SCALES - GENERAL: PAINLEVEL_OUTOF10: 2

## 2024-02-23 NOTE — PROGRESS NOTES
"  Physical Therapy Treatment    Patient Name: Shagufta Crowley  MRN: [id number]  Today's Date: 2/23/2024  Time Calculation  Start Time: 1306  Stop Time: 1353  Time Calculation (min): 47 min  PT Therapeutic Procedures Time Entry  Manual Therapy Time Entry: 10  Neuromuscular Re-Education Time Entry: 3  Therapeutic Exercise Time Entry: 32,      Current Problem  1. Status post left hip replacement  Follow Up In Physical Therapy            Insurance:  Payor: HARDIK / Plan: ANTHEM HMP / Product Type: *No Product type* /   Number of Treatments Authorized: 5/50          Subjective   General  Reason for Referral: L LORETO posterior lateral approach 1/09/2024  Referred By: Dr. Saleh  Past Medical History Relevant to Rehab: Prior L ankle fracture with chronic intoeing  General Comment: Patient states that the back of her hip and side are sore.    Performing HEP?: Yes    Precautions  Precautions  STEADI Fall Risk Score (The score of 4 or more indicates an increased risk of falling): 3  Post-Surgical Precautions: Left hip precautions (Posterior lateral approach)  Precautions Comment: Moderate fall risk  Pain  Pain Score: 2  Pain Location: Hip  Pain Orientation: Left  Pain Descriptors: Throbbing    Objective   Cane in R hand    Treatments:    Therapeutic Exercise  Therapeutic Exercise Activity 1: Ambulating with SP cane from door to bike SBA  Therapeutic Exercise Activity 2: Scifit hills lvl 1 x 6 min  Therapeutic Exercise Activity 3: HS stretch x 1 min ea  Therapeutic Exercise Activity 4: Dynamics with cane: march, buttock kick, side lunge, tin soldier  Therapeutic Exercise Activity 5: Standing clamshell 2 x 10 yellow PB ea  Therapeutic Exercise Activity 6: 6\" step up 3 x 5 ea LE  Therapeutic Exercise Activity 7: PROM L hip in extension    Balance/Neuromuscular Re-Education  Balance/Neuromuscular Re-Education Activity Performed: Yes  Balance/Neuromuscular Re-Education Activity 1: Rocker lateral tilt x 3 min    Manual " Therapy  Manual Therapy Activity 1: Supine STM L anterior hip at rectus and quad; R SL with pillow between LE L lateral hip, L abductors release      OP EDUCATION:  Outpatient Education  Education Comment: Continue    Assessment:  PT Assessment  PT Assessment Results: Decreased strength, Decreased range of motion  Assessment Comment: Patient with improved step up ability with 6 inch compared to 4 inches last session.  Progressed to standing exercise with weightbearing clamshells with increased fatigue on left.  Was able to perform dynamic stability required standby assist throughout.  Overall progressing well per protocol.    Plan:  OP PT Plan  PT Plan: Skilled PT (Gait with SP cane, hip strengthening with standing exercises, step ups, hip PROM/AROM, STM prn)  PT Frequency: 2 times per week  Duration: 8 weeks  Onset Date: 01/09/24  Number of Treatments Authorized: 5/50  Rehab Potential: Good    Goals:  Active       PT Problem       PT Goal 1       Start:  02/07/24    Expected End:  05/01/24       STG  1) Patient will be able to complete all normal activities with pain no greater than 1 /10 in 6 weeks.  2) Patient will improve their left hip flexion and ER ROM by 10 degrees in order to allow for great ease with transfers as well as donning and doffing socks without assistive devices in 6 weeks.  3) Patient will be independent with HEP in 3 visits to allow for continued improvement in daily tasks at home and in the community.    LTG  1) Patient will improve LEFS to 50 /80 in order to allow for greater completion of functional activities at home and in the community in 10 weeks.  2) Patient will have 4+ /5 strength in lateral hip stabilizers to prevent any descending compensations required for proper gait mechanics in 8 weeks.  3) Patient will be able to perform proper squatting technique in 6 weeks in order prevent increased pain with ADLs.            Patient Stated Goal 1       Start:  02/07/24    Expected End:   05/01/24       Strength and control              Morteza Vivas, PT

## 2024-02-26 ENCOUNTER — TREATMENT (OUTPATIENT)
Dept: PHYSICAL THERAPY | Facility: CLINIC | Age: 64
End: 2024-02-26
Payer: COMMERCIAL

## 2024-02-26 DIAGNOSIS — Z96.642 STATUS POST LEFT HIP REPLACEMENT: ICD-10-CM

## 2024-02-26 PROCEDURE — 97140 MANUAL THERAPY 1/> REGIONS: CPT | Mod: GP | Performed by: PHYSICAL THERAPIST

## 2024-02-26 PROCEDURE — 97110 THERAPEUTIC EXERCISES: CPT | Mod: GP | Performed by: PHYSICAL THERAPIST

## 2024-02-26 ASSESSMENT — PAIN SCALES - GENERAL: PAINLEVEL_OUTOF10: 2

## 2024-02-26 ASSESSMENT — PAIN DESCRIPTION - DESCRIPTORS: DESCRIPTORS: THROBBING

## 2024-02-26 NOTE — PROGRESS NOTES
"  Physical Therapy Treatment    Patient Name: Shagufta Crowley  MRN: [id number]  Today's Date: 2/26/2024  Time Calculation  Start Time: 1402  Stop Time: 1447  Time Calculation (min): 45 min  PT Therapeutic Procedures Time Entry  Manual Therapy Time Entry: 8  Therapeutic Exercise Time Entry: 34,      Current Problem  1. Status post left hip replacement  Follow Up In Physical Therapy            Insurance:  Payor: ANTHEM / Plan: ANTHEM HMP / Product Type: *No Product type* /   Number of Treatments Authorized: 6/50          Subjective   General  Reason for Referral: L LORETO posterior lateral approach 1/09/2024  Referred By: Dr. Saleh  Past Medical History Relevant to Rehab: Prior L ankle fracture with chronic intoeing  General Comment: Patient states that she did some stairs this weekend. Notes that she is planning on going to a store after this to walk.    Performing HEP?: Yes    Precautions  Precautions  STEADI Fall Risk Score (The score of 4 or more indicates an increased risk of falling): 3  Post-Surgical Precautions: Left hip precautions (Posterior lateral approach)  Precautions Comment: Moderate fall risk  Pain  Pain Score: 2  Pain Location: Hip  Pain Orientation: Left  Pain Descriptors: Throbbing    Objective   Cane    Treatments:    Therapeutic Exercise  Therapeutic Exercise Activity 1: Ambulating with SP cane from door to bike SBA  Therapeutic Exercise Activity 2: Scifit hills lvl 3 x 6 min  Therapeutic Exercise Activity 3: Gastroc stretch x 1 min  Therapeutic Exercise Activity 4: Heel raise on slant board x 20  Therapeutic Exercise Activity 5: Dynamics with cane: march, buttock kick, side lunge, tin soldier  Therapeutic Exercise Activity 6: Standing adductor slider blue CLX 3 x 10  Therapeutic Exercise Activity 7: 6\" fwd step up 10# weight 3 x 8 ea LE  Therapeutic Exercise Activity 8: [place] ball bridge 2 x 10  Therapeutic Exercise Activity 9: PROM L hip in extension and abduction         Manual " Therapy  Manual Therapy Activity 1: STM L psoas, proximal quadriceps      OP EDUCATION:  Outpatient Education  Education Comment: Continue HEP    Assessment:  PT Assessment  PT Assessment Results: Decreased strength, Decreased range of motion  Assessment Comment: Patient with overall improvement in gait speed as well as dynamics this session.  Minor increase in bilateral knee pain with step ups however was able to tolerate increased height with additional weight.  Will continue to focus on soft tissue mobilization as this allows for improved groin and anterior hip pain.    Plan:  OP PT Plan  PT Plan: Skilled PT (Gait with SP cane, hip strengthening with standing exercises, step ups, hip PROM/AROM, STM prn)  PT Frequency: 2 times per week  Duration: 8 weeks  Onset Date: 01/09/24  Number of Treatments Authorized: 6/50  Rehab Potential: Good    Goals:  Active       PT Problem       PT Goal 1       Start:  02/07/24    Expected End:  05/01/24       STG  1) Patient will be able to complete all normal activities with pain no greater than 1 /10 in 6 weeks.  2) Patient will improve their left hip flexion and ER ROM by 10 degrees in order to allow for great ease with transfers as well as donning and doffing socks without assistive devices in 6 weeks.  3) Patient will be independent with HEP in 3 visits to allow for continued improvement in daily tasks at home and in the community.    LTG  1) Patient will improve LEFS to 50 /80 in order to allow for greater completion of functional activities at home and in the community in 10 weeks.  2) Patient will have 4+ /5 strength in lateral hip stabilizers to prevent any descending compensations required for proper gait mechanics in 8 weeks.  3) Patient will be able to perform proper squatting technique in 6 weeks in order prevent increased pain with ADLs.            Patient Stated Goal 1       Start:  02/07/24    Expected End:  05/01/24       Strength and control              Morteza CURRAN  Sangeetha, PT

## 2024-03-01 ENCOUNTER — TREATMENT (OUTPATIENT)
Dept: PHYSICAL THERAPY | Facility: CLINIC | Age: 64
End: 2024-03-01
Payer: COMMERCIAL

## 2024-03-01 DIAGNOSIS — Z96.642 STATUS POST LEFT HIP REPLACEMENT: ICD-10-CM

## 2024-03-01 PROCEDURE — 97140 MANUAL THERAPY 1/> REGIONS: CPT | Mod: GP | Performed by: PHYSICAL THERAPIST

## 2024-03-01 PROCEDURE — 97110 THERAPEUTIC EXERCISES: CPT | Mod: GP | Performed by: PHYSICAL THERAPIST

## 2024-03-01 ASSESSMENT — PAIN SCALES - GENERAL: PAINLEVEL_OUTOF10: 1

## 2024-03-01 ASSESSMENT — PAIN DESCRIPTION - DESCRIPTORS: DESCRIPTORS: THROBBING

## 2024-03-01 NOTE — PROGRESS NOTES
"  Physical Therapy Treatment    Patient Name: Shagufta Crowley  MRN: [id number]  Today's Date: 3/1/2024  Time Calculation  Start Time: 1300  Stop Time: 1346  Time Calculation (min): 46 min  PT Therapeutic Procedures Time Entry  Manual Therapy Time Entry: 10  Neuromuscular Re-Education Time Entry: 3  Therapeutic Exercise Time Entry: 30,      Current Problem  1. Status post left hip replacement  Follow Up In Physical Therapy            Insurance:  Payor: ANTHEM / Plan: ANTHEM HMP / Product Type: *No Product type* /   Number of Treatments Authorized: 7/50          Subjective   General  Reason for Referral: L LORETO posterior lateral approach 1/09/2024  Referred By: Dr. Saleh  Past Medical History Relevant to Rehab: Prior L ankle fracture with chronic intoeing  General Comment: Patient states that she has been walking with her cane more at home. Notes increased soreness in her hip and knees.    Performing HEP?: Yes    Precautions  Precautions  STEADI Fall Risk Score (The score of 4 or more indicates an increased risk of falling): 3  Post-Surgical Precautions: Left hip precautions (Posterior lateral approach)  Precautions Comment: Moderate fall risk  Pain  Pain Score: 1  Pain Location: Hip  Pain Orientation: Left  Pain Descriptors: Throbbing    Objective   Cane with ambulation    Treatments:    Therapeutic Exercise  Therapeutic Exercise Activity 1: Ambulating with SP cane from door to bike SBA  Therapeutic Exercise Activity 2: Scifit hills lvl 3 x 6 min  Therapeutic Exercise Activity 3: Gastroc stretch x 1 min  Therapeutic Exercise Activity 4: Total gym lvl 5 3 x 10 with 3\" pause  Therapeutic Exercise Activity 5: SUpine SLR over cone 2 x 10 ea direction  Therapeutic Exercise Activity 6: PROM hip flexion and ER    Balance/Neuromuscular Re-Education  Balance/Neuromuscular Re-Education Activity 1: Rocker lateral tilt x 3 min    Manual Therapy  Manual Therapy Activity 1: STM L psoas, proximal quadriceps      OP " Spoke with Mom. Verbalizes understanding. No further questions or concerns.    Dang Melendrez RN     EDUCATION:  Outpatient Education  Education Comment: Continue HEP    Assessment:  PT Assessment  PT Assessment Results: Decreased strength, Decreased range of motion  Assessment Comment: Patient with continued anterior discomfort along anterior and lateral hip. Educated patient on post workout soreness as well as soreness with weaning onto cane more. Noted improvement in SLR quality though abduction remains limited. Progressing well overall and seeing more of a step through gait rather than step to.    Plan:  OP PT Plan  PT Plan: Skilled PT (Gait with SP cane, hip strengthening with standing exercises, step ups, hip PROM/AROM, STM prn)  PT Frequency: 2 times per week  Duration: 8 weeks  Onset Date: 01/09/24  Number of Treatments Authorized: 7/50  Rehab Potential: Good    Goals:  Active       PT Problem       PT Goal 1       Start:  02/07/24    Expected End:  05/01/24       STG  1) Patient will be able to complete all normal activities with pain no greater than 1 /10 in 6 weeks.  2) Patient will improve their left hip flexion and ER ROM by 10 degrees in order to allow for great ease with transfers as well as donning and doffing socks without assistive devices in 6 weeks.  3) Patient will be independent with HEP in 3 visits to allow for continued improvement in daily tasks at home and in the community.    LTG  1) Patient will improve LEFS to 50 /80 in order to allow for greater completion of functional activities at home and in the community in 10 weeks.  2) Patient will have 4+ /5 strength in lateral hip stabilizers to prevent any descending compensations required for proper gait mechanics in 8 weeks.  3) Patient will be able to perform proper squatting technique in 6 weeks in order prevent increased pain with ADLs.            Patient Stated Goal 1       Start:  02/07/24    Expected End:  05/01/24       Strength and control              Morteza Vivas, PT

## 2024-03-08 ENCOUNTER — APPOINTMENT (OUTPATIENT)
Dept: PHYSICAL THERAPY | Facility: CLINIC | Age: 64
End: 2024-03-08
Payer: COMMERCIAL

## 2024-03-11 ENCOUNTER — TREATMENT (OUTPATIENT)
Dept: PHYSICAL THERAPY | Facility: CLINIC | Age: 64
End: 2024-03-11
Payer: COMMERCIAL

## 2024-03-11 DIAGNOSIS — Z96.642 STATUS POST LEFT HIP REPLACEMENT: ICD-10-CM

## 2024-03-11 PROCEDURE — 97110 THERAPEUTIC EXERCISES: CPT | Mod: GP | Performed by: PHYSICAL THERAPIST

## 2024-03-11 PROCEDURE — 97140 MANUAL THERAPY 1/> REGIONS: CPT | Mod: GP | Performed by: PHYSICAL THERAPIST

## 2024-03-11 ASSESSMENT — PAIN SCALES - GENERAL: PAINLEVEL_OUTOF10: 1

## 2024-03-11 ASSESSMENT — PAIN DESCRIPTION - DESCRIPTORS: DESCRIPTORS: THROBBING

## 2024-03-11 NOTE — PROGRESS NOTES
Physical Therapy Progress Note    Patient Name: Shagufta Crowley  MRN: [id number]  Today's Date: 3/11/2024  Time Calculation  Start Time: 0915  Stop Time: 1012  Time Calculation (min): 57 min  PT Therapeutic Procedures Time Entry  Manual Therapy Time Entry: 12  Therapeutic Exercise Time Entry: 46,      Current Problem  1. Status post left hip replacement  Follow Up In Physical Therapy            Insurance:  Payor: ANTH / Plan: ANTHEM HMP / Product Type: *No Product type* /   Number of Treatments Authorized: 8/50 Progress Note          Subjective   General  Reason for Referral: L LORETO posterior lateral approach 1/09/2024  Referred By: Dr. Saleh  Past Medical History Relevant to Rehab: Prior L ankle fracture with chronic intoeing  General Comment: Patient states that the front of her hip is less painful. Notes that the incision is more tingly. However, the R knee is painful.    Performing HEP?: Yes    Precautions  Precautions  STEADI Fall Risk Score (The score of 4 or more indicates an increased risk of falling): 3  Post-Surgical Precautions: Left hip precautions (Posterior lateral approach)  Precautions Comment: Moderate fall risk  Pain  Pain Score: 1  Pain Location: Hip  Pain Orientation: Left  Pain Descriptors: Throbbing    Objective   HIP    Hip Palpation/Joint Mobility   Palpation / Joint Mobility Comment: TTP L gluteus medius    Hip AROM  R hip flexion: (125°): 90  L hip flexion: (125°): 85  L hip ER: (45°): 36  R hip IR: (45°): 30  L hip IR: (45°): 20  Hip PROM  R hip flexion: (125°): 95  L hip flexion: (125°): 90  L hip ER: (45°): 36  R hip IR: (45°): 30  L hip IR: (45°): 25  Specific Lower Extremity MMT  L Iliopsoas: (5/5): 4/5  L Gluteals (prone): (5/5): 4/5  R Gluteals (sidelying): (5/5): 4+/5  L Gluteals (sidelying): (5/5): 3+/5  L Hip External Rotation: (5/5): 4/5  R knee flexion: (5/5): 5-/5  L knee flexion: (5/5): 5-/5  R knee extension: (5/5): 4+/5  L knee extension: (5/5): 4/5    Gait  Gait  "Comment: Cane in R UE with improved trendelenburg. Reduced step length bilaterally without cane ambulation    Outcome Measures:  Other Measures  Lower Extremity Funtional Score (LEFS): 46/80    Treatments:    Therapeutic Exercise  Therapeutic Exercise Activity 1: Ambulating with SP cane from door to bike SBA  Therapeutic Exercise Activity 2: Scifit hills lvl 3 x 6 min  Therapeutic Exercise Activity 3: Gastroc stretch x 1 min  Therapeutic Exercise Activity 4: Dynamics with cane: march, buttock kick, side lunge, tin soldier  Therapeutic Exercise Activity 5: Matrix retro stepping 12.5# x 10  Therapeutic Exercise Activity 6: Matrix side stepping 10# x10 ea  Therapeutic Exercise Activity 7: Sit to stand no pad 15# KB x 10  Therapeutic Exercise Activity 8: Standing hip abduction to ball on wall 5\" hold x 10 ea LE  Therapeutic Exercise Activity 9: Martiniquais ball bridge 5\" pause x 15  Therapeutic Exercise Activity 10: PROM hip flexion and ER  Therapeutic Exercise Activity 11: SL hip abduction x 5         Manual Therapy  Manual Therapy Activity 1: Supine STM L anterior hip at rectus and quad; R SL  L lateral hip, L abductors release    OP EDUCATION:  Outpatient Education  Education Comment: Access Code: [email or web address]  URL: [email or web address]  Date: 03/11/2024  Prepared by: Morteza Vivas    Exercises  - Sidelying Hip Abduction  - 1 x daily - 7 x weekly - 3 sets - 10 reps  - Supine Single Bent Knee Fallout  - 2-3 x daily - 7 x weekly - 1 sets - 3 reps - 1 min hold    Assessment:  PT Assessment  PT Assessment Results: Decreased strength, Decreased range of motion  Assessment Comment: Patient has attended 8 physical therapy visits following her left total hip arthroplasty via posterior lateral approach.  Patient has seen improvement in lower extremity range of motion, strength as well as ambulation.  However she continues to lack reduced lower extremity strength and stability requiring her to use an assist device " of baseline prior to surgery.  Therefore she will continue to require skilled physical therapy in order to address remaining deficits for full return to pain-free function in the left.    Plan:  OP PT Plan  PT Plan: Skilled PT (Gait with SP cane, hip strengthening with standing exercises, step ups, hip PROM/AROM, STM prn)  PT Frequency: 2 times per week  Duration: 8 weeks  Onset Date: 01/09/24  Number of Treatments Authorized: 8/50 Progress Note  Rehab Potential: Good    Goals:  Active       PT Problem       PT Goal 1       Start:  02/07/24    Expected End:  05/01/24       STG  1) Patient will be able to complete all normal activities with pain no greater than 1 /10 in 6 weeks. - goal met  2) Patient will improve their left hip flexion and ER ROM by 10 degrees in order to allow for great ease with transfers as well as donning and doffing socks without assistive devices in 6 weeks. - goal met  3) Patient will be independent with HEP in 3 visits to allow for continued improvement in daily tasks at home and in the community. - goal met    LTG  1) Patient will improve LEFS to 50 /80 in order to allow for greater completion of functional activities at home and in the community in 10 weeks. - in progress  2) Patient will have 4+ /5 strength in lateral hip stabilizers to prevent any descending compensations required for proper gait mechanics in 8 weeks. - in progress  3) Patient will be able to perform proper squatting technique in 6 weeks in order prevent increased pain with ADLs. - in progress           Patient Stated Goal 1       Start:  02/07/24    Expected End:  05/01/24       Strength and control - in progress              Morteza Vivas, PT

## 2024-03-15 ENCOUNTER — TREATMENT (OUTPATIENT)
Dept: PHYSICAL THERAPY | Facility: CLINIC | Age: 64
End: 2024-03-15
Payer: COMMERCIAL

## 2024-03-15 DIAGNOSIS — Z96.642 STATUS POST LEFT HIP REPLACEMENT: ICD-10-CM

## 2024-03-15 PROCEDURE — 97110 THERAPEUTIC EXERCISES: CPT | Mod: GP | Performed by: PHYSICAL THERAPIST

## 2024-03-15 PROCEDURE — 97116 GAIT TRAINING THERAPY: CPT | Mod: GP | Performed by: PHYSICAL THERAPIST

## 2024-03-15 PROCEDURE — 97140 MANUAL THERAPY 1/> REGIONS: CPT | Mod: GP | Performed by: PHYSICAL THERAPIST

## 2024-03-15 ASSESSMENT — PAIN SCALES - GENERAL: PAINLEVEL_OUTOF10: 0 - NO PAIN

## 2024-03-15 NOTE — PROGRESS NOTES
"  Physical Therapy Treatment    Patient Name: Shagufta Crowley  MRN: [id number]  Today's Date: 3/15/2024  Time Calculation  Start Time: 0907  Stop Time: 0952  Time Calculation (min): 45 min  PT Therapeutic Procedures Time Entry  Manual Therapy Time Entry: 10  Therapeutic Exercise Time Entry: 20  Gait Training Time Entry: 14       Current Problem  1. Status post left hip replacement  Follow Up In Physical Therapy          Insurance:  Number of Treatments Authorized: 9/50 (update at visit 8)          Subjective   General  Reason for Referral: L LORETO posterior lateral approach 1/09/2024  Referred By: Dr. Saleh  Past Medical History Relevant to Rehab: Prior L ankle fracture with chronic intoeing  General Comment: Pt reports new exercises are very challenging. Notes she's worried about returning to work in april. 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 No problem with getting in/out of the car but hasn't driven yet. Icing helps - soreness intermittent.    Performing HEP?: Yes - s/l leg lifts very difficult     Precautions  Precautions  STEADI Fall Risk Score (The score of 4 or more indicates an increased risk of falling): 3  Post-Surgical Precautions: Left hip precautions (Posterior lateral approach)  Precautions Comment: Mod fall risk  Pain  Pain Score: 0 - No pain  Pain Location: Hip  Pain Orientation: Left    Objective   Increased L hip trendelenburg without SP cane   (+) tenderness post hip     Treatments:  Therapeutic Exercise  Therapeutic Exercise Activity 1: Ambulating with SP cane from door to bike SBA  Therapeutic Exercise Activity 2: Scifit hills lvl 3 x 6 min  Therapeutic Exercise Activity 3: Heel/toe raises x 1 min  Therapeutic Exercise Activity 4: R/L alt march with LE tap to 8\" x 1 min  Therapeutic Exercise Activity 5: L fwd step ups 4\" x 10 reps  Therapeutic Exercise Activity 6: R/L lateral step ups/over x 1 min with UE support      Manual Therapy  Manual Therapy Activity 1: R S/L, L " glute and QL release, rotators strumming and lateral hip release    Ambulation/Gait Training  Ambulation/Gait Training Performed: Yes  Ambulation/Gait Training 1  Surface 1: Carpet  Comments/Distance (ft) 1: Pre gait training without AD; L LE WB with R fwd step and return to neutral x 2 min; R LE WB with L LE fwd step and return to neutral x 1 min; L LE WB with R bkwd step and return to neutral x 2 min; R LE WB with L LE bkwd step and return to neutral x 1 min; F/B R LE stepping x 1 min - smaller steps; ambulating with SP cane x 100' with focus on increasing LLE WB    OP EDUCATION:  Outpatient Education  Education Comment: Sub clams for s/l hip abduction    Assessment:  PT Assessment  Assessment Comment: Pt overall progressing well under POC. Pt continues with heavy reliance on cane for AD. Very challenged with gait activities without UE support and will continue to need more strengthening prior to being able to safety ambulate without AD, as needed for her job. Able to complete low step ups with fatigue. Sensitivity and tenderness persists at glute region. Warned on post tx soreness.    Plan:  OP PT Plan  PT Plan: Skilled PT (Gait with SP cane, hip strengthening with standing exercises, step ups, hip PROM/AROM, STM prn)  PT Frequency: 2 times per week  Duration: 8 weeks  Onset Date: 01/09/24  Number of Treatments Authorized: 9/50 (update at visit 8)  Rehab Potential: Good    Goals:  Active       PT Problem       PT Goal 1       Start:  02/07/24    Expected End:  05/01/24       STG  1) Patient will be able to complete all normal activities with pain no greater than 1 /10 in 6 weeks. - goal met  2) Patient will improve their left hip flexion and ER ROM by 10 degrees in order to allow for great ease with transfers as well as donning and doffing socks without assistive devices in 6 weeks. - goal met  3) Patient will be independent with HEP in 3 visits to allow for continued improvement in daily tasks at home and in the  community. - goal met    LTG  1) Patient will improve LEFS to 50 /80 in order to allow for greater completion of functional activities at home and in the community in 10 weeks. - in progress  2) Patient will have 4+ /5 strength in lateral hip stabilizers to prevent any descending compensations required for proper gait mechanics in 8 weeks. - in progress  3) Patient will be able to perform proper squatting technique in 6 weeks in order prevent increased pain with ADLs. - in progress           Patient Stated Goal 1       Start:  02/07/24    Expected End:  05/01/24       Strength and control - in progress              Leslie Peña, PT

## 2024-03-18 ENCOUNTER — HOSPITAL ENCOUNTER (OUTPATIENT)
Dept: RADIOLOGY | Facility: CLINIC | Age: 64
Discharge: HOME | End: 2024-03-18
Payer: COMMERCIAL

## 2024-03-18 ENCOUNTER — OFFICE VISIT (OUTPATIENT)
Dept: ORTHOPEDIC SURGERY | Facility: CLINIC | Age: 64
End: 2024-03-18
Payer: COMMERCIAL

## 2024-03-18 DIAGNOSIS — Z96.642 STATUS POST LEFT HIP REPLACEMENT: ICD-10-CM

## 2024-03-18 DIAGNOSIS — Z96.642 STATUS POST LEFT HIP REPLACEMENT: Primary | ICD-10-CM

## 2024-03-18 PROCEDURE — 1036F TOBACCO NON-USER: CPT | Performed by: ORTHOPAEDIC SURGERY

## 2024-03-18 PROCEDURE — 99024 POSTOP FOLLOW-UP VISIT: CPT | Performed by: ORTHOPAEDIC SURGERY

## 2024-03-18 PROCEDURE — 73502 X-RAY EXAM HIP UNI 2-3 VIEWS: CPT | Mod: LT

## 2024-03-18 PROCEDURE — 73502 X-RAY EXAM HIP UNI 2-3 VIEWS: CPT | Mod: LEFT SIDE | Performed by: RADIOLOGY

## 2024-03-18 ASSESSMENT — PAIN - FUNCTIONAL ASSESSMENT: PAIN_FUNCTIONAL_ASSESSMENT: NO/DENIES PAIN

## 2024-03-18 NOTE — PROGRESS NOTES
S: Pain well controlled.  More muscle pain.  Using a cane for support.  Still doing therapy.     O: No change    XR: I personally reviewed the following radiographic exams: AP pelvis left hip shows well-fixed well aligned left total hip.    A: S/P left total hip.    P: No restrictions.  Work on strength and endurance.  Follow-up examination 1 month.

## 2024-03-19 ENCOUNTER — APPOINTMENT (OUTPATIENT)
Dept: PHYSICAL THERAPY | Facility: CLINIC | Age: 64
End: 2024-03-19
Payer: COMMERCIAL

## 2024-03-22 ENCOUNTER — TREATMENT (OUTPATIENT)
Dept: PHYSICAL THERAPY | Facility: CLINIC | Age: 64
End: 2024-03-22
Payer: COMMERCIAL

## 2024-03-22 DIAGNOSIS — Z96.642 STATUS POST LEFT HIP REPLACEMENT: ICD-10-CM

## 2024-03-22 PROCEDURE — 97110 THERAPEUTIC EXERCISES: CPT | Mod: GP | Performed by: PHYSICAL THERAPIST

## 2024-03-22 PROCEDURE — 97116 GAIT TRAINING THERAPY: CPT | Mod: GP | Performed by: PHYSICAL THERAPIST

## 2024-03-22 ASSESSMENT — PAIN SCALES - GENERAL: PAINLEVEL_OUTOF10: 0 - NO PAIN

## 2024-03-22 NOTE — PROGRESS NOTES
"  Physical Therapy Treatment    Patient Name: Shagufta Crowley  MRN: [id number]  Today's Date: 3/22/2024  Time Calculation  Start Time: 0902  Stop Time: 1000  Time Calculation (min): 58 min  PT Therapeutic Procedures Time Entry  Manual Therapy Time Entry: 5  Therapeutic Exercise Time Entry: 45  Gait Training Time Entry: 5,      Current Problem  1. Status post left hip replacement  Follow Up In Physical Therapy            Insurance:  Payor: ANTHEM / Plan: ANTHEM HMP / Product Type: *No Product type* /              Subjective   General  Reason for Referral: L LORETO posterior lateral approach 1/09/2024  Referred By: Dr. Saleh  Past Medical History Relevant to Rehab: Prior L ankle fracture with chronic intoeing  General Comment: Patient states that she saw the MD who stated she does not need to follow precautions. Notes he did not release her to work and her work wants her off the cane 75% of the time. Has tried walking without her cane.    Performing HEP?: Yes    Precautions  Precautions  STEADI Fall Risk Score (The score of 4 or more indicates an increased risk of falling): 3  Post-Surgical Precautions:  (3/22/2024 no precautions per 3/18/2024 MD visit follow up.)  Precautions Comment: Mod fall risk  Pain  Pain Score: 0 - No pain    Objective   Cane in R UE    Treatments:    Therapeutic Exercise  Therapeutic Exercise Activity 1: Ambulating with SP cane from door to bike SBA  Therapeutic Exercise Activity 2: Scifit hills lvl 3 x 6 min  Therapeutic Exercise Activity 3: Heel/toe raises x 1 min  Therapeutic Exercise Activity 4: Dynamics with cane: march, buttock kick, open L hip, tin soldier  Therapeutic Exercise Activity 5: Standing hip abduction to ball on wall 5\" hold x 10 ea LE  Therapeutic Exercise Activity 6: 4\" hip hikes 2 x 10 ea LE  Therapeutic Exercise Activity 7: March holds 5\" 2 x 20 ea LE 1 hand on // bar  Therapeutic Exercise Activity 8: SLS with 1 hand on // bar 3 x 15 sec on L  Therapeutic Exercise " "Activity 9: Seated flexion x 5 5\" holds  Therapeutic Exercise Activity 10: PROM L hip abduction and flexion         Manual Therapy  Manual Therapy Activity 1: L psoas and quadriceps release in supine    Ambulation/Gait Training 1  Comments/Distance (ft) 1: Pre gait training without AD; L LE WB with R fwd step and return to neutral x 2 min; R LE WB with L LE fwd step and return to neutral x 1 min      OP EDUCATION:  Outpatient Education  Education Comment: Access Code: [email or web address]  URL: [email or web address]  Date: 03/22/2024  Prepared by: Morteza Vivas    Exercises  - Seated Flexion Stretch  - 2 x daily - 7 x weekly - 2 sets - 5 reps - 5 sec hold  - Single Leg Stance with Support  - 2 x daily - 7 x weekly - 1 sets - 5 reps - 15 sec hold    Assessment:  PT Assessment  PT Assessment Results: Decreased strength, Decreased range of motion  Assessment Comment: Patient continues to fatigue with left single-leg stance exercises that did focus on gluteus medius activation and endurance.  Educated patient on increasing hold times at home and adding single-leg standing with assist to build endurance for greater step length on right.  Also added seated flexion for greater hip flexion though did educate on not sustaining prolonged periods though required to be improvements to don and doff shoes independently.    Plan:       Goals:  Active       PT Problem       PT Goal 1       Start:  02/07/24    Expected End:  05/01/24       STG  1) Patient will be able to complete all normal activities with pain no greater than 1 /10 in 6 weeks. - goal met  2) Patient will improve their left hip flexion and ER ROM by 10 degrees in order to allow for great ease with transfers as well as donning and doffing socks without assistive devices in 6 weeks. - goal met  3) Patient will be independent with HEP in 3 visits to allow for continued improvement in daily tasks at home and in the community. - goal met    LTG  1) Patient will " improve LEFS to 50 /80 in order to allow for greater completion of functional activities at home and in the community in 10 weeks. - in progress  2) Patient will have 4+ /5 strength in lateral hip stabilizers to prevent any descending compensations required for proper gait mechanics in 8 weeks. - in progress  3) Patient will be able to perform proper squatting technique in 6 weeks in order prevent increased pain with ADLs. - in progress           Patient Stated Goal 1       Start:  02/07/24    Expected End:  05/01/24       Strength and control - in progress              Morteza Vivas, PT

## 2024-03-25 ENCOUNTER — APPOINTMENT (OUTPATIENT)
Dept: PHYSICAL THERAPY | Facility: CLINIC | Age: 64
End: 2024-03-25
Payer: COMMERCIAL

## 2024-03-29 ENCOUNTER — APPOINTMENT (OUTPATIENT)
Dept: PHYSICAL THERAPY | Facility: CLINIC | Age: 64
End: 2024-03-29
Payer: COMMERCIAL

## 2024-04-01 ENCOUNTER — TREATMENT (OUTPATIENT)
Dept: PHYSICAL THERAPY | Facility: CLINIC | Age: 64
End: 2024-04-01
Payer: COMMERCIAL

## 2024-04-01 DIAGNOSIS — Z96.642 STATUS POST LEFT HIP REPLACEMENT: ICD-10-CM

## 2024-04-01 PROCEDURE — 97140 MANUAL THERAPY 1/> REGIONS: CPT | Mod: GP | Performed by: PHYSICAL THERAPIST

## 2024-04-01 PROCEDURE — 97110 THERAPEUTIC EXERCISES: CPT | Mod: GP | Performed by: PHYSICAL THERAPIST

## 2024-04-01 PROCEDURE — 97116 GAIT TRAINING THERAPY: CPT | Mod: GP | Performed by: PHYSICAL THERAPIST

## 2024-04-01 ASSESSMENT — PAIN SCALES - GENERAL: PAINLEVEL_OUTOF10: 0 - NO PAIN

## 2024-04-01 NOTE — PROGRESS NOTES
"  Physical Therapy Treatment    Patient Name: Shagufta Crowley  MRN: [id number]  Today's Date: 4/1/2024  Time Calculation  Start Time: 1426  Stop Time: 1506  Time Calculation (min): 40 min  PT Therapeutic Procedures Time Entry  Manual Therapy Time Entry: 10  Therapeutic Exercise Time Entry: 21  Gait Training Time Entry: 9       Current Problem  1. Status post left hip replacement  Follow Up In Physical Therapy          Insurance:  Number of Treatments Authorized: 11/50 (update at visit 8)          Subjective   General  Reason for Referral: L LORETO posterior lateral approach 1/09/2024  Referred By: Dr. Saleh  Past Medical History Relevant to Rehab: Prior L ankle fracture with chronic intoeing  General Comment: Pt reports she's been sick and not feeling well. Notes they are trying to get her back to work. Notes she's able to do more around the house.    Performing HEP?: Partially - has been sick    Precautions  Precautions  STEADI Fall Risk Score (The score of 4 or more indicates an increased risk of falling): 3  Post-Surgical Precautions:  (3/22/2024 no precautions per 3/18/2024 MD visit follow up.)  Precautions Comment: mod fall risk  Pain  Pain Score: 0 - No pain  Pain Location: Hip  Pain Orientation: Left    Objective       Treatments:  Therapeutic Exercise  Therapeutic Exercise Activity 1: Ambulating with SP cane from door to bike SBA  Therapeutic Exercise Activity 2: Scifit hills lvl 3 x 6 min  Therapeutic Exercise Activity 3: R/L alt march at rail with light UE support x 1 min  Therapeutic Exercise Activity 4: Heel raises x 1 min  Therapeutic Exercise Activity 5: B hip adductor squeeze 3\" x 1 min  Therapeutic Exercise Activity 6: B hip adductor squeeze yellow ball with bridge x 1 min  Therapeutic Exercise Activity 7: TA activation with R/L march x 1 min  Therapeutic Exercise Activity 8: TA activation with R/L hold to isometric x 1 min  Therapeutic Exercise Activity 9: PROM L hip all dir      Manual " Therapy  Manual Therapy Activity 1: L psoas and quadriceps release in supine; L glute in R S/L    Ambulation/Gait Training 1  Comments/Distance (ft) 1: Pre gait training without AD; L LE WB with R fwd step and return to neutral x 2 min; R LE WB with L LE fwd step and return to neutral x 1 min; R/L side stepping without UE support x 1 min, Ambulating with SP cane with less support 40' x 2; Ambulating without SP cane 40' x 2    OP EDUCATION:  Outpatient Education  Education Comment: Continue current HEP; gait with less support on SP cane    Assessment:  PT Assessment  Assessment Comment: Pt is showing improvements with gait using SP cane - coaxed to use for ambulation but with less weight going through R UE. Continues to be challenged with exercises without UE support, however limited also d/t R knee pain. Pt late to session and reduced energy levels from being sick.    Plan:  OP PT Plan  PT Plan: Skilled PT (Gait with SP cane, hip strengthening with standing exercises, step ups, hip PROM/AROM, STM prn)  PT Frequency: 2 times per week  Duration: 8 weeks  Onset Date: 01/09/24  Number of Treatments Authorized: 11/50 (update at visit 8)  Rehab Potential: Good    Goals:  Active       PT Problem       PT Goal 1       Start:  02/07/24    Expected End:  05/01/24       STG  1) Patient will be able to complete all normal activities with pain no greater than 1 /10 in 6 weeks. - goal met  2) Patient will improve their left hip flexion and ER ROM by 10 degrees in order to allow for great ease with transfers as well as donning and doffing socks without assistive devices in 6 weeks. - goal met  3) Patient will be independent with HEP in 3 visits to allow for continued improvement in daily tasks at home and in the community. - goal met    LTG  1) Patient will improve LEFS to 50 /80 in order to allow for greater completion of functional activities at home and in the community in 10 weeks. - in progress  2) Patient will have 4+ /5  strength in lateral hip stabilizers to prevent any descending compensations required for proper gait mechanics in 8 weeks. - in progress  3) Patient will be able to perform proper squatting technique in 6 weeks in order prevent increased pain with ADLs. - in progress           Patient Stated Goal 1       Start:  02/07/24    Expected End:  05/01/24       Strength and control - in progress              Leslie Peña, PT

## 2024-04-08 ENCOUNTER — TREATMENT (OUTPATIENT)
Dept: PHYSICAL THERAPY | Facility: CLINIC | Age: 64
End: 2024-04-08
Payer: COMMERCIAL

## 2024-04-08 DIAGNOSIS — Z96.642 STATUS POST LEFT HIP REPLACEMENT: Primary | ICD-10-CM

## 2024-04-08 PROCEDURE — 97110 THERAPEUTIC EXERCISES: CPT | Mod: GP | Performed by: PHYSICAL THERAPIST

## 2024-04-08 PROCEDURE — 97140 MANUAL THERAPY 1/> REGIONS: CPT | Mod: GP | Performed by: PHYSICAL THERAPIST

## 2024-04-08 ASSESSMENT — PAIN SCALES - GENERAL: PAINLEVEL_OUTOF10: 0 - NO PAIN

## 2024-04-08 NOTE — PROGRESS NOTES
"  Physical Therapy Treatment    Patient Name: Shagufta Crowley  MRN: [id number]  Today's Date: 4/8/2024  Time Calculation  Start Time: 1032  Stop Time: 1116  Time Calculation (min): 44 min  PT Therapeutic Procedures Time Entry  Manual Therapy Time Entry: 9  Therapeutic Exercise Time Entry: 34       Current Problem  1. Status post left hip replacement            Insurance:  Number of Treatments Authorized: 12/50 (update at visit 8)          Subjective   General  Reason for Referral: L LORETO posterior lateral approach 1/09/2024  Referred By: Dr. Saleh  Past Medical History Relevant to Rehab: Prior L ankle fracture with chronic intoeing  General Comment: Trying to walk a lot without cane. Returns to [place] friday. Today is last day for FMLA. Feeling like she's getting stronger.    Performing HEP?: Yes    Precautions  Precautions  STEADI Fall Risk Score (The score of 4 or more indicates an increased risk of falling): 3  Post-Surgical Precautions:  (3/22/2024 no precautions per 3/18/2024 MD visit follow up.)  Precautions Comment: Low fall risk  Pain  Pain Score: 0 - No pain  Pain Location: Hip  Pain Orientation: Left    Objective   Ambulating with SP cane - less support and improved step lengths       Treatments:  Therapeutic Exercise  Therapeutic Exercise Activity 1: Ambulating with SP cane from door to bike SBA  Therapeutic Exercise Activity 2: Scifit hills lvl 3 x 6 min  Therapeutic Exercise Activity 3: Heel raises x 1 min  Therapeutic Exercise Activity 4: Dynamics with cane: march x 4, buttock kick x2, tin soldier x2, x 40'  Therapeutic Exercise Activity 5: R/L side stepping 40' x 2 each dir  Therapeutic Exercise Activity 6: R,L hip march with LE tap to 8\" step x 1 min each  Therapeutic Exercise Activity 7: R,L diagonal hip extension yellow loop 10 reps each x 2  Therapeutic Exercise Activity 8: R,L hip abduction yellow loop x 10 reps each  Therapeutic Exercise Activity 9: R,L hip flexion straight with yellow loop x 10 " reps each  Therapeutic Exercise Activity 10: Mimicking range for shoe tying  Therapeutic Exercise Activity 11: Seated lumbar flexion with yellow swiss ball (to improved flexibility for tying shoes) x 1 min  Therapeutic Exercise Activity 12: *Mm roller stick  Therapeutic Exercise Activity 13: PROM L hip all dir         Manual Therapy  Manual Therapy Activity 1: L psoas and quadriceps release in supine    OP EDUCATION:  Outpatient Education  Education Comment: Continue current HEP    Assessment:  PT Assessment  Assessment Comment: Pt progressing well under POC with improved gait. Standing amarilis to exercises increasing without rest breaks needed. Challenged with resisted hip strengthening but performing without an increase in pain. Pain relief with mm roller stick and PROM L hip.    Plan:  OP PT Plan  PT Plan: Skilled PT (Gait with SP cane, hip strengthening with standing exercises, step ups, hip PROM/AROM, STM prn)  PT Frequency: 2 times per week  Duration: 8 weeks  Onset Date: 01/09/24  Number of Treatments Authorized: 12/50 (update at visit 8)  Rehab Potential: Good    Goals:  Active       PT Problem       PT Goal 1       Start:  02/07/24    Expected End:  05/01/24       STG  1) Patient will be able to complete all normal activities with pain no greater than 1 /10 in 6 weeks. - goal met  2) Patient will improve their left hip flexion and ER ROM by 10 degrees in order to allow for great ease with transfers as well as donning and doffing socks without assistive devices in 6 weeks. - goal met  3) Patient will be independent with HEP in 3 visits to allow for continued improvement in daily tasks at home and in the community. - goal met    LTG  1) Patient will improve LEFS to 50 /80 in order to allow for greater completion of functional activities at home and in the community in 10 weeks. - in progress  2) Patient will have 4+ /5 strength in lateral hip stabilizers to prevent any descending compensations required for proper  gait mechanics in 8 weeks. - in progress  3) Patient will be able to perform proper squatting technique in 6 weeks in order prevent increased pain with ADLs. - in progress           Patient Stated Goal 1       Start:  02/07/24    Expected End:  05/01/24       Strength and control - in progress              Leslie Peña, PT

## 2024-04-12 ENCOUNTER — TREATMENT (OUTPATIENT)
Dept: PHYSICAL THERAPY | Facility: CLINIC | Age: 64
End: 2024-04-12
Payer: COMMERCIAL

## 2024-04-12 ENCOUNTER — OFFICE VISIT (OUTPATIENT)
Dept: ORTHOPEDIC SURGERY | Facility: CLINIC | Age: 64
End: 2024-04-12
Payer: COMMERCIAL

## 2024-04-12 DIAGNOSIS — Z96.642 STATUS POST LEFT HIP REPLACEMENT: Primary | ICD-10-CM

## 2024-04-12 PROCEDURE — 97110 THERAPEUTIC EXERCISES: CPT | Mod: GP | Performed by: PHYSICAL THERAPIST

## 2024-04-12 PROCEDURE — 99213 OFFICE O/P EST LOW 20 MIN: CPT | Performed by: ORTHOPAEDIC SURGERY

## 2024-04-12 ASSESSMENT — PAIN SCALES - GENERAL: PAINLEVEL_OUTOF10: 0 - NO PAIN

## 2024-04-12 ASSESSMENT — PAIN - FUNCTIONAL ASSESSMENT: PAIN_FUNCTIONAL_ASSESSMENT: NO/DENIES PAIN

## 2024-04-12 NOTE — PROGRESS NOTES
"  Physical Therapy Treatment    Patient Name: Shagufta Crowley  MRN: [id number]  Today's Date: 4/12/2024  Time Calculation  Start Time: 0815  Stop Time: 0856  Time Calculation (min): 41 min  PT Therapeutic Procedures Time Entry  Manual Therapy Time Entry: 3  Therapeutic Exercise Time Entry: 38       Current Problem  1. Status post left hip replacement  Follow Up In Physical Therapy          Insurance:  Number of Treatments Authorized: 13/50 (update at visit 8)          Subjective   General  Reason for Referral: L LORETO posterior lateral approach 1/09/2024  Referred By: Dr. Saleh  Past Medical History Relevant to Rehab: Prior L ankle fracture with chronic intoeing  General Comment: Pt reports she's doing well. Stiffness at times but not too bad. Driving without trouble.    Performing HEP?: Yes    Precautions  Precautions  STEADI Fall Risk Score (The score of 4 or more indicates an increased risk of falling): 3  Precautions Comment: low fall risk  Pain  Pain Score: 0 - No pain  Pain Location: Hip  Pain Orientation: Left    Objective   (+) sensitivity at L quad     Compensated trendelenburg without SP cane     Treatments:  Therapeutic Exercise  Therapeutic Exercise Activity 1: Ambulating with SP cane from door to bike  Therapeutic Exercise Activity 2: [employer] hills lvl 3 x 5 min  Therapeutic Exercise Activity 3: Ambulating with SP cane 40' x 4 with cues for equal steps, ambulating without SP cane 40' x 2  Therapeutic Exercise Activity 4: Heel raises x 1 min  Therapeutic Exercise Activity 5: R/L side stepping 40' x 2 each dir (without cane)  Therapeutic Exercise Activity 6: Dynamics with cane: march x 4, buttock kick x2, tin soldier x2, x 40'  Therapeutic Exercise Activity 7: L hip flexion march x 1 min  Therapeutic Exercise Activity 8: L SLS 3\" x 1 min  Therapeutic Exercise Activity 9: L fwd step ups 4\" x 1 min  Therapeutic Exercise Activity 10: L lateral step up/over 4\" x 2  min (UE support)  Therapeutic Exercise " "Activity 11: R step downs off 4\" step for LLE eccentric x 1 min  Therapeutic Exercise Activity 12: PROM L hip all dir  Therapeutic Exercise Activity 13: R/L hamstring stretch x 1 min each  Therapeutic Exercise Activity 14: Standing L hip diagonal extension x 1 min    Manual Therapy  Manual Therapy Activity 1: L psoas and quadriceps release in seated with roller    OP EDUCATION:  Outpatient Education  Education Comment: Mm roller stick    Assessment:  PT Assessment  Assessment Comment: Pt is progressing well under POC with reduced compensation with use of SP cane and increased amarilis to standing strengthening. Antalgia and compensatory tredelenburg without AD. Challenged with step ups but able to perform with lower step. Tenderness at L quad/psoas. Pt should be appropriate to RTW with using SP cane. Returns to MD today.    Plan:  OP PT Plan  PT Plan: Skilled PT (Gait with SP cane, hip strengthening with standing exercises, step ups, hip PROM/AROM, STM prn)  PT Frequency: 2 times per week  Duration: 8 weeks  Onset Date: 01/09/24  Number of Treatments Authorized: 13/50 (update at visit 8)  Rehab Potential: Good    Goals:  Active       PT Problem       PT Goal 1       Start:  02/07/24    Expected End:  05/01/24       STG  1) Patient will be able to complete all normal activities with pain no greater than 1 /10 in 6 weeks. - goal met  2) Patient will improve their left hip flexion and ER ROM by 10 degrees in order to allow for great ease with transfers as well as donning and doffing socks without assistive devices in 6 weeks. - goal met  3) Patient will be independent with HEP in 3 visits to allow for continued improvement in daily tasks at home and in the community. - goal met    LTG  1) Patient will improve LEFS to 50 /80 in order to allow for greater completion of functional activities at home and in the community in 10 weeks. - in progress  2) Patient will have 4+ /5 strength in lateral hip stabilizers to prevent any " descending compensations required for proper gait mechanics in 8 weeks. - in progress  3) Patient will be able to perform proper squatting technique in 6 weeks in order prevent increased pain with ADLs. - in progress           Patient Stated Goal 1       Start:  02/07/24    Expected End:  05/01/24       Strength and control - in progress              Leslie Peña, PT

## 2024-04-12 NOTE — LETTER
April 12, 2024     Patient: Shagufta Ngo Foster   YOB: 1960   Date of Visit: 4/12/2024       To Whom It May Concern:    It is my medical opinion that Shagufta Jeni Foster may return to work on 05/01/2024 full duty .    If you have any questions or concerns, please don't hesitate to call [phone].         Sincerely,        Bello Saleh MD

## 2024-04-12 NOTE — PROGRESS NOTES
Finishing up therapy.  Plan return to work early May.    Exam: Unchanged.    Assessment: Status post left total hip.    Plan: Finish up therapy this month.  No restrictions.  Return to work May 1.  Follow-up x-ray left hip 1 year postop.

## 2024-04-17 ENCOUNTER — APPOINTMENT (OUTPATIENT)
Dept: PHYSICAL THERAPY | Facility: CLINIC | Age: 64
End: 2024-04-17
Payer: COMMERCIAL

## 2024-04-19 ENCOUNTER — APPOINTMENT (OUTPATIENT)
Dept: PHYSICAL THERAPY | Facility: CLINIC | Age: 64
End: 2024-04-19
Payer: COMMERCIAL

## 2024-04-22 ENCOUNTER — APPOINTMENT (OUTPATIENT)
Dept: PHYSICAL THERAPY | Facility: CLINIC | Age: 64
End: 2024-04-22
Payer: COMMERCIAL

## 2024-04-22 ENCOUNTER — OFFICE VISIT (OUTPATIENT)
Dept: PODIATRY | Facility: CLINIC | Age: 64
End: 2024-04-22
Payer: COMMERCIAL

## 2024-04-22 DIAGNOSIS — G89.29 CHRONIC PAIN OF RIGHT HEEL: ICD-10-CM

## 2024-04-22 DIAGNOSIS — M79.671 CHRONIC PAIN OF RIGHT HEEL: ICD-10-CM

## 2024-04-22 DIAGNOSIS — M72.2 PLANTAR FASCIITIS: ICD-10-CM

## 2024-04-22 DIAGNOSIS — M79.671 RIGHT FOOT PAIN: Primary | ICD-10-CM

## 2024-04-22 PROCEDURE — 1036F TOBACCO NON-USER: CPT | Performed by: PODIATRIST

## 2024-04-22 PROCEDURE — 20600 DRAIN/INJ JOINT/BURSA W/O US: CPT | Performed by: PODIATRIST

## 2024-04-22 PROCEDURE — 99203 OFFICE O/P NEW LOW 30 MIN: CPT | Performed by: PODIATRIST

## 2024-04-22 RX ORDER — TRIAMCINOLONE ACETONIDE 40 MG/ML
40 INJECTION, SUSPENSION INTRA-ARTICULAR; INTRAMUSCULAR ONCE
Status: COMPLETED | OUTPATIENT
Start: 2024-04-22 | End: 2024-04-22

## 2024-04-22 RX ADMIN — TRIAMCINOLONE ACETONIDE 40 MG: 40 INJECTION, SUSPENSION INTRA-ARTICULAR; INTRAMUSCULAR at 13:24

## 2024-04-22 NOTE — PROGRESS NOTES
History of Present Illness:   Patient states they are here for heel pain  Recently had left total hip replacement  Right heel has since been hurting  Denies trauma  No other pedal complaints    Past Medical History  Past Medical History:   Diagnosis Date    Hyperlipidemia     Hypertension     Obese     Prediabetes        Medications and Allergies have been reviewed.    Review Of Systems:  GENERAL: No weight loss, malaise or fevers.  HEENT: Negative for frequent or significant headaches,   RESPIRATORY: Negative for cough, wheezing or shortness of breath.  CARDIOVASCULAR: Negative for chest pain, leg swelling or palpitations.    Examination of Both Lower Extremities:   Objective:   Vasc: DP and PT pulses are palpable bilateral.  CFT is less than 3 seconds bilateral.  Skin temperature is warm to cool proximal to distal bilateral.      Neuro: Vibratory, light touch and proprioception are intact bilateral.      Derm: Nails 1-5 bilateral are intact.  Skin is supple with normal texture and turgor noted.  Webspaces are clean, dry and intact bilateral.  There are no hyperkeratoses, ulcerations, verruca or other lesions noted.      Ortho: Muscle strength is 5/5 for all pedal groups tested. Right plantar heel . Pain with palpation. Pain at PF insertion    1. Right foot pain        2. Plantar fasciitis        3. Chronic pain of right heel          Patient exam and eval  Discussed Plantar fasciitis/heel spur of the foot.  Discussed causes, symptoms, aggravating factors and treatment options  Discussed radiographs  Recommend stiff supportive shoe gear  Shoes that do not twist or bend  Discussed injection to plantar fascia to help decrease pain  R plantar fascia inj  Discussed ice, nsaids, lizz fernandes/biofreeze  Patient to follow up if no improvement noted.   Pt in agreement to plan  All questions answered      Telma Mendiola DPM  [phone]  [facility] [address]  Fax: [phone]

## 2024-05-02 ENCOUNTER — TREATMENT (OUTPATIENT)
Dept: PHYSICAL THERAPY | Facility: CLINIC | Age: 64
End: 2024-05-02
Payer: COMMERCIAL

## 2024-05-02 DIAGNOSIS — Z96.642 STATUS POST LEFT HIP REPLACEMENT: ICD-10-CM

## 2024-05-02 DIAGNOSIS — M16.10 ARTHRITIS, HIP: Primary | ICD-10-CM

## 2024-05-02 PROCEDURE — 97140 MANUAL THERAPY 1/> REGIONS: CPT | Mod: GP | Performed by: PHYSICAL THERAPIST

## 2024-05-02 PROCEDURE — 97110 THERAPEUTIC EXERCISES: CPT | Mod: GP | Performed by: PHYSICAL THERAPIST

## 2024-05-02 ASSESSMENT — PAIN SCALES - GENERAL: PAINLEVEL_OUTOF10: 0 - NO PAIN

## 2024-05-02 NOTE — PROGRESS NOTES
Physical Therapy Treatment    Patient Name: Shagufta Crowley  MRN: [id number]  Today's Date: 5/2/2024  Time Calculation  Start Time: 0815  Stop Time: 0900  Time Calculation (min): 45 min  PT Therapeutic Procedures Time Entry  Manual Therapy Time Entry: 10  Therapeutic Exercise Time Entry: 29  Therapeutic Activity Time Entry: 5       Current Problem  1. Arthritis, hip        2. Status post left hip replacement  Follow Up In Physical Therapy          Insurance:  Number of Treatments Authorized: 14/50 (update at visit 8)          Subjective   General  Reason for Referral: L LORETO posterior lateral approach 1/09/2024  Referred By: Dr. Saleh  Past Medical History Relevant to Rehab: Prior L ankle fracture with chronic intoeing  General Comment: Pt reports she's doing better overall - pain in hip isn't too bad but still some incisional pain/irritation when she pushes on it. Goes back to work on sunday. Body feels very tight - needs to do more stretching. Is dealing with pain in R foot - saw podiatrist - got injection but not much improvement.    Performing HEP?: Yes    Precautions  Precautions  STEADI Fall Risk Score (The score of 4 or more indicates an increased risk of falling): 3  Precautions Comment: low fall risk  Pain  Pain Score: 0 - No pain  Pain Location: Hip  Pain Orientation: Left    Objective   Ambulating with antalgia and L LE limp - without SP cane   (+) tenderness at lateral L hip and scar     Treatments:  Therapeutic Exercise  Therapeutic Exercise Activity 1: Scifit hills lvl 3 x 6 min  Therapeutic Exercise Activity 2: Gastroc stretch slantboard x 2 min  Therapeutic Exercise Activity 3: R,L hip abduction x 1 min each  Therapeutic Exercise Activity 4: R,L hip extension x 1 min each  Therapeutic Exercise Activity 5: R/L side stepping with yellow loop 1 min x 2  Therapeutic Exercise Activity 6: R,L diagonal hip extension yellow loop x 1 min each slowly  Therapeutic Exercise Activity 7: L SLS x 1 min light  "UE support  Therapeutic Exercise Activity 8: L hip flexor stretch off table x 1 min  Therapeutic Exercise Activity 9: L QS + SLR x 1 min    Therapeutic Activity  Therapeutic Activity 1: L fwd step 4\" x 1 min  Therapeutic Activity 2: Demo getting up/down from floor with surgical vs non surgical leg      Manual Therapy  Manual Therapy Activity 1: L psoas and quadriceps release supine  Manual Therapy Activity 2: Scar mobs    OP EDUCATION:  Outpatient Education  Education Comment: Access Code: [email or web address]  URL: [email or web address]  Date: 05/02/2024  Prepared by: Leslie Peña    Exercises  - Modified Joshua Stretch  - 1 x daily - 7 x weekly - 1 sets - 2 reps - 1-2 minutes hold    Assessment:  PT Assessment  Assessment Comment: Pt showing good progress overall - however R PF pain limiting progression in standing this date. Challenged with all L hip strengthening - educated on pain vs mm fatigue with fair understanding. Difficulty with step ups but completing without UE support. Sensitivity persists at L lateral hip.    Plan:  OP PT Plan  PT Plan: Skilled PT (Gait with SP cane, hip strengthening with standing exercises, step ups, hip PROM/AROM, STM prn)  PT Frequency: 2 times per week  Duration: 8 weeks  Onset Date: 01/09/24  Number of Treatments Authorized: 14/50 (update at visit 8)  Rehab Potential: Good    Goals:  Active       PT Problem       PT Goal 1       Start:  02/07/24    Expected End:  05/01/24       STG  1) Patient will be able to complete all normal activities with pain no greater than 1 /10 in 6 weeks. - goal met  2) Patient will improve their left hip flexion and ER ROM by 10 degrees in order to allow for great ease with transfers as well as donning and doffing socks without assistive devices in 6 weeks. - goal met  3) Patient will be independent with HEP in 3 visits to allow for continued improvement in daily tasks at home and in the community. - goal met    LTG  1) Patient will " improve LEFS to 50 /80 in order to allow for greater completion of functional activities at home and in the community in 10 weeks. - in progress  2) Patient will have 4+ /5 strength in lateral hip stabilizers to prevent any descending compensations required for proper gait mechanics in 8 weeks. - in progress  3) Patient will be able to perform proper squatting technique in 6 weeks in order prevent increased pain with ADLs. - in progress           Patient Stated Goal 1       Start:  02/07/24    Expected End:  05/01/24       Strength and control - in progress              Leslie Peña, PT

## 2024-05-07 ENCOUNTER — TREATMENT (OUTPATIENT)
Dept: PHYSICAL THERAPY | Facility: CLINIC | Age: 64
End: 2024-05-07
Payer: COMMERCIAL

## 2024-05-07 DIAGNOSIS — Z96.642 STATUS POST LEFT HIP REPLACEMENT: ICD-10-CM

## 2024-05-07 DIAGNOSIS — M16.10 ARTHRITIS, HIP: Primary | ICD-10-CM

## 2024-05-07 PROCEDURE — 97140 MANUAL THERAPY 1/> REGIONS: CPT | Mod: GP | Performed by: PHYSICAL THERAPIST

## 2024-05-07 PROCEDURE — 97110 THERAPEUTIC EXERCISES: CPT | Mod: GP | Performed by: PHYSICAL THERAPIST

## 2024-05-07 ASSESSMENT — PAIN SCALES - GENERAL: PAINLEVEL_OUTOF10: 0 - NO PAIN

## 2024-05-07 NOTE — PROGRESS NOTES
"  Physical Therapy Treatment    Patient Name: Shagufta Crowley  MRN: [id number]  Today's Date: 5/7/2024  Time Calculation  Start Time: 0831  Stop Time: 0914  Time Calculation (min): 43 min  PT Therapeutic Procedures Time Entry  Manual Therapy Time Entry: 8  Therapeutic Exercise Time Entry: 33       Current Problem  1. Arthritis, hip        2. Status post left hip replacement  Follow Up In Physical Therapy          Insurance:  Number of Treatments Authorized: 15/50 (update at visit 8)          Subjective   General  Reason for Referral: L LORETO posterior lateral approach 1/09/2024  Referred By: Dr. Saleh  Past Medical History Relevant to Rehab: Prior L ankle fracture with chronic intoeing  General Comment: Pt reports she returned to work this weekend. Hip is doing fairly well - was sitting a lot and B knees are very sore. Hard to move around this morning. Pt reports hip stretch off bed feels really good. Wants stretches/exercises for knees.    Performing HEP?: Yes    Precautions  Precautions  STEADI Fall Risk Score (The score of 4 or more indicates an increased risk of falling): 3  Precautions Comment: low fall risk  Pain  Pain Score: 0 - No pain  Pain Location: Hip  Pain Orientation: Left    Objective   Antalgic gait - lacking L hip ext - trendelenburg      Treatments:  Therapeutic Exercise  Therapeutic Exercise Activity 1: Scifit hills lvl 3 x 6 min  Therapeutic Exercise Activity 2: Gastroc stretch slantboard x 1 min  Therapeutic Exercise Activity 3: R,L hamstring stretch 30\" x 2  Therapeutic Exercise Activity 4: Mini squats x 1 min  Therapeutic Exercise Activity 5: Mini squats with UE support and glute focus x 10 reps  Therapeutic Exercise Activity 6: Heel raises x 1 min  Therapeutic Exercise Activity 7: R/L alt march with 1 UE support x 1 min  Therapeutic Exercise Activity 8: R/L butt kicks with UE support x 1 min  Therapeutic Exercise Activity 9: R,L hip hiking x 1 min each, repeat  Therapeutic Exercise " "Activity 10: R,L hip flexor stretch off table x 2 min  Therapeutic Exercise Activity 11: Seated HS stretch R 30\" x 3  Therapeutic Exercise Activity 12: Gastroc stretch standing review - HEP review      Manual Therapy  Manual Therapy Activity 1: L psoas and quadriceps release supine    OP EDUCATION:  Outpatient Education  Education Comment: Access Code: [email or web address]  URL: [email or web address]  Date: 05/07/2024  Prepared by: Leslie Peña    Exercises  - Seated Hamstring Stretch  - 2 x daily - 7 x weekly - 1 sets - 3 reps - 30 hold  - Standing Hamstring Stretch with Step  - 2 x daily - 7 x weekly - 1 sets - 3 reps - 30 hold  - Gastroc Stretch on Wall  - 1 x daily - 7 x weekly - 1 sets - 3 reps - 30-60 sec hold  - Hip Hiking on Step  - 1 x daily - 7 x weekly - 2 sets - 10 reps  - Mini Squat with Counter Support  - 1 x daily - 7 x weekly - 2 sets - 10 reps    Assessment:  PT Assessment  Assessment Comment: Continues to be limited with standing program d/t antalgia with now B knee pain. Challenged with hip hiking. Pain relief with stretching - updated for HEP. Minimal ROM in squatting but tolerating well with cues for posterior weight shift.    Plan:  OP PT Plan  PT Plan: Skilled PT (Gait with SP cane, hip strengthening with standing exercises, step ups, hip PROM/AROM, STM prn)  PT Frequency: 1 time per week  Duration: 8 weeks  Onset Date: 01/09/24  Number of Treatments Authorized: 15/50 (update at visit 8)  Rehab Potential: Good    Goals:  Active       PT Problem       PT Goal 1 (Progressing)       Start:  02/07/24    Expected End:  05/01/24       STG  1) Patient will be able to complete all normal activities with pain no greater than 1 /10 in 6 weeks. - goal met  2) Patient will improve their left hip flexion and ER ROM by 10 degrees in order to allow for great ease with transfers as well as donning and doffing socks without assistive devices in 6 weeks. - goal met  3) Patient will be independent " with HEP in 3 visits to allow for continued improvement in daily tasks at home and in the community. - goal met    LTG  1) Patient will improve LEFS to 50 /80 in order to allow for greater completion of functional activities at home and in the community in 10 weeks. - in progress  2) Patient will have 4+ /5 strength in lateral hip stabilizers to prevent any descending compensations required for proper gait mechanics in 8 weeks. - in progress  3) Patient will be able to perform proper squatting technique in 6 weeks in order prevent increased pain with ADLs. - in progress           Patient Stated Goal 1 (Progressing)       Start:  02/07/24    Expected End:  05/01/24       Strength and control - in progress              Leslie Peña, PT

## 2024-05-17 ENCOUNTER — APPOINTMENT (OUTPATIENT)
Dept: OBSTETRICS AND GYNECOLOGY | Facility: CLINIC | Age: 64
End: 2024-05-17
Payer: COMMERCIAL

## 2024-05-24 ENCOUNTER — APPOINTMENT (OUTPATIENT)
Dept: PHYSICAL THERAPY | Facility: CLINIC | Age: 64
End: 2024-05-24
Payer: COMMERCIAL

## 2024-05-29 ENCOUNTER — TREATMENT (OUTPATIENT)
Dept: PHYSICAL THERAPY | Facility: CLINIC | Age: 64
End: 2024-05-29
Payer: COMMERCIAL

## 2024-05-29 DIAGNOSIS — Z96.642 STATUS POST LEFT HIP REPLACEMENT: ICD-10-CM

## 2024-05-29 PROCEDURE — 97140 MANUAL THERAPY 1/> REGIONS: CPT | Mod: GP | Performed by: PHYSICAL THERAPIST

## 2024-05-29 PROCEDURE — 97110 THERAPEUTIC EXERCISES: CPT | Mod: GP | Performed by: PHYSICAL THERAPIST

## 2024-05-29 ASSESSMENT — PAIN SCALES - GENERAL: PAINLEVEL_OUTOF10: 0 - NO PAIN

## 2024-05-29 NOTE — PROGRESS NOTES
"  Physical Therapy Treatment    Patient Name: Shagufta Crowley  MRN: [id number]  Today's Date: 5/29/2024  Time Calculation  Start Time: 1045  Stop Time: 1130  Time Calculation (min): 45 min  PT Therapeutic Procedures Time Entry  Manual Therapy Time Entry: 10  Therapeutic Exercise Time Entry: 28  Therapeutic Activity Time Entry: 5,      Current Problem  1. Status post left hip replacement  Follow Up In Physical Therapy            Insurance:  Payor: ANTHEM / Plan: ANTHEM HMP / Product Type: *No Product type* /   Number of Treatments Authorized: 16/50 (update at visit 8)          Subjective   General  Reason for Referral: L LORETO posterior lateral approach 1/09/2024  Referred By: Dr. Saleh  Past Medical History Relevant to Rehab: Prior L ankle fracture with chronic intoeing  General Comment: Patient states that her knees are the bigger inhibitor of her walking progress. Notes that she she has been also having pain in her R foot that has not improved much. L hip is 60-70% of full strength.    Performing HEP?: Yes    Precautions  Precautions  STEADI Fall Risk Score (The score of 4 or more indicates an increased risk of falling): 3  Precautions Comment: low fall risk  Pain  Pain Score: 0 - No pain (7/10 R foot, 9/10 B knees)  Pain Location: Hip    Objective   TTP R plantar foot    Treatments:    Therapeutic Exercise  Therapeutic Exercise Activity 1: Scifit hills lvl 3 x 6 min  Therapeutic Exercise Activity 2: Gastroc stretch x 1 min  Therapeutic Exercise Activity 3: Soleus stretch x 1 min  Therapeutic Exercise Activity 4: Great toe R flexion against resistance x 15  Therapeutic Exercise Activity 5: Toe yoga x 2 min  Therapeutic Exercise Activity 6: Standing gastroc stretch R with toe extension x 1 min  Therapeutic Exercise Activity 7: Seated flexion x 10  Therapeutic Exercise Activity 8: Heel raise with 5\" pause x 20  Therapeutic Exercise Activity 9: Side stepping yellow loop 2 x 20 ft  Therapeutic Exercise Activity 10: " Zig zag fwd and bwd x 20 ft ea yellow loop         Manual Therapy  Manual Therapy Activity 1: STM: R plantar foot    Therapeutic Activity  Therapeutic Activity 1: Sit to stand staggered R foot out 3 x 10 15# weight        OP EDUCATION:  Outpatient Education  Education Comment: Access Code: [email or web address]  URL: [email or web address]  Date: 05/29/2024  Prepared by: Morteza Vivas    Exercises  - Gastroc Stretch on Wall  - 2 x daily - 7 x weekly - 1 sets - 3 reps - 1 min hold  - Heel Raises with Counter Support  - 1 x daily - 7 x weekly - 3 sets - 10 reps - 5 sec hold  - Toe Yoga - Alternating Great Toe and Lesser Toe Extension  - 2 x daily - 7 x weekly - 1 sets - 1 reps - 3 min hold  - Great Toe Flexion with Resistance  - 2 x daily - 7 x weekly - 2 sets - 15 reps  - Seated Lumbar Flexion Stretch  - 2 x daily - 7 x weekly - 1 sets - 10 reps    Assessment:  PT Assessment  Assessment Comment: Patient progressing well with lower extremity strengthening exercises.  Educated patient on plantar loading and improving posterior chain stretching with greater time on feet and step count over the past month.  Improved gait mechanics with reduced lateral list onto left lower extremity with stance.  Overall progressing towards goals and anticipating discharge soon.    Plan:  OP PT Plan  PT Plan: Skilled PT (Gait with SP cane, hip strengthening with standing exercises, step ups, hip PROM/AROM, STM prn)  PT Frequency: 1 time per week  Duration: 8 weeks  Onset Date: 01/09/24  Number of Treatments Authorized: 16/50 (update at visit 8)  Rehab Potential: Good    Goals:  Active       PT Problem       PT Goal 1 (Progressing)       Start:  02/07/24    Expected End:  05/01/24       STG  1) Patient will be able to complete all normal activities with pain no greater than 1 /10 in 6 weeks. - goal met  2) Patient will improve their left hip flexion and ER ROM by 10 degrees in order to allow for great ease with transfers as well as  donning and doffing socks without assistive devices in 6 weeks. - goal met  3) Patient will be independent with HEP in 3 visits to allow for continued improvement in daily tasks at home and in the community. - goal met    LTG  1) Patient will improve LEFS to 50 /80 in order to allow for greater completion of functional activities at home and in the community in 10 weeks. - in progress  2) Patient will have 4+ /5 strength in lateral hip stabilizers to prevent any descending compensations required for proper gait mechanics in 8 weeks. - in progress  3) Patient will be able to perform proper squatting technique in 6 weeks in order prevent increased pain with ADLs. - in progress           Patient Stated Goal 1 (Progressing)       Start:  02/07/24    Expected End:  05/01/24       Strength and control - in progress              Morteza Vivas, PT

## 2024-06-04 ENCOUNTER — TREATMENT (OUTPATIENT)
Dept: PHYSICAL THERAPY | Facility: CLINIC | Age: 64
End: 2024-06-04
Payer: COMMERCIAL

## 2024-06-04 DIAGNOSIS — M16.10 ARTHRITIS, HIP: Primary | ICD-10-CM

## 2024-06-04 DIAGNOSIS — Z96.642 STATUS POST LEFT HIP REPLACEMENT: ICD-10-CM

## 2024-06-04 PROCEDURE — 97140 MANUAL THERAPY 1/> REGIONS: CPT | Mod: GP | Performed by: PHYSICAL THERAPIST

## 2024-06-04 PROCEDURE — 97110 THERAPEUTIC EXERCISES: CPT | Mod: GP | Performed by: PHYSICAL THERAPIST

## 2024-06-04 PROCEDURE — 97530 THERAPEUTIC ACTIVITIES: CPT | Mod: GP | Performed by: PHYSICAL THERAPIST

## 2024-06-04 ASSESSMENT — PAIN SCALES - GENERAL: PAINLEVEL_OUTOF10: 0 - NO PAIN

## 2024-06-04 NOTE — PROGRESS NOTES
"  Physical Therapy Progress and Discharge Note    Patient Name: Shagufta Crowley  MRN: [id number]  Today's Date: 6/4/2024  Time Calculation  Start Time: 1046  Stop Time: 1130  Time Calculation (min): 44 min  PT Therapeutic Procedures Time Entry  Manual Therapy Time Entry: 9  Therapeutic Exercise Time Entry: 19  Therapeutic Activity Time Entry: 15       Current Problem  1. Arthritis, hip        2. Status post left hip replacement  Follow Up In Physical Therapy          Insurance:  Number of Treatments Authorized: 17/50 (update at visit 8)          Subjective   General  Reason for Referral: L LORETO posterior lateral approach 1/09/2024  Referred By: Dr. Saleh  Past Medical History Relevant to Rehab: Prior L ankle fracture with chronic intoeing  General Comment: Pt reports she's doing well with her hip. More trouble with her R knee and R foot. Has been doing some exercises for her foot. Feels ready for discharge.    Performing HEP?: Yes    Precautions  Precautions  STEADI Fall Risk Score (The score of 4 or more indicates an increased risk of falling): 3  Precautions Comment: low fall risk  Pain  Pain Score: 0 - No pain  Pain Location: Hip    Objective   Compensated trendelenburg gait without device    L hip mobility WFL - stiff with ER    L glute 4/5, L hip abd 4/5    Painful R knee and R foot with prolonged WB exercises    Outcome Measures:  Other Measures  Lower Extremity Funtional Score (LEFS): 51/80    Treatments:  Therapeutic Exercise  Therapeutic Exercise Activity 1: Scifit hills lvl 3 x 6 min  Therapeutic Exercise Activity 2: Gastroc stretch slantboard x 1 min  Therapeutic Exercise Activity 3: Soleus stretch x 1 min  Therapeutic Exercise Activity 4: gastroc stretch at wall x 45 sec, staggered x 45 sec  Therapeutic Exercise Activity 5: Heel raises off slantboard x 1 min  Therapeutic Exercise Activity 6: Heel raises off 4\" step x 1 min  Therapeutic Exercise Activity 7: PROM L hip all dir with ER " "focus    Therapeutic Activity  Therapeutic Activity 1: L fwd 4\" step ups x 1 min  Therapeutic Activity 2: R/L lateral step up/overs 4\" x 1 min  Therapeutic Activity 3: R step downs 4\" x 1 min (L LE eccentric)  Therapeutic Activity 4: Sit<>stand from std chair without UE support x 1 min  Therapeutic Activity 5: Ambulating 40' x 8 with differing speeds and cues for proper pattern - improving with reps    Manual Therapy  Manual Therapy Activity 1: STM: L glute, lateral hip, quad    OP EDUCATION:  Outpatient Education  Education Comment: Review gastroc stretching - sit<>stands and step ups for hip    Assessment:  PT Assessment  Assessment Comment: Pt overall progressing well toward goals. Pt continues to demonstrate weakness with hip, however able to complete ADLs and iADLs without limitations based on the hip. Pt having more difficulty with R knee and R foot at this time. Coaxed for continued strengthening with HEP and focus on reducing compensation with gait with good understanding. Appropriate for d/c with HEP at this time.    Plan:  OP PT Plan  PT Plan: No Additional PT interventions required at this time  PT Frequency:  (discharge)  Onset Date: 01/09/24  Number of Treatments Authorized: 17/50 (update at visit 8)  Rehab Potential: Good    Goals:  Resolved       PT Problem       PT Goal 1 (Met)       Start:  02/07/24    Expected End:  05/01/24    Resolved:  06/04/24    STG  1) Patient will be able to complete all normal activities with pain no greater than 1 /10 in 6 weeks. - goal met  2) Patient will improve their left hip flexion and ER ROM by 10 degrees in order to allow for great ease with transfers as well as donning and doffing socks without assistive devices in 6 weeks. - goal met  3) Patient will be independent with HEP in 3 visits to allow for continued improvement in daily tasks at home and in the community. - goal met    LTG  1) Patient will improve LEFS to 50 /80 in order to allow for greater completion " of functional activities at home and in the community in 10 weeks. - in progress  2) Patient will have 4+ /5 strength in lateral hip stabilizers to prevent any descending compensations required for proper gait mechanics in 8 weeks. - in progress  3) Patient will be able to perform proper squatting technique in 6 weeks in order prevent increased pain with ADLs. - in progress           Patient Stated Goal 1 (Met)       Start:  02/07/24    Expected End:  05/01/24    Resolved:  06/04/24    Strength and control - in progress              Leslie Peña, PT

## 2024-06-10 DIAGNOSIS — E78.5 HYPERLIPIDEMIA, UNSPECIFIED HYPERLIPIDEMIA TYPE: ICD-10-CM

## 2024-06-10 DIAGNOSIS — E11.9 TYPE 2 DIABETES MELLITUS WITHOUT COMPLICATION, WITH LONG-TERM CURRENT USE OF INSULIN (MULTI): ICD-10-CM

## 2024-06-10 DIAGNOSIS — Z79.4 TYPE 2 DIABETES MELLITUS WITHOUT COMPLICATION, WITH LONG-TERM CURRENT USE OF INSULIN (MULTI): ICD-10-CM

## 2024-06-10 DIAGNOSIS — I10 HYPERTENSION, UNSPECIFIED TYPE: ICD-10-CM

## 2024-06-13 RX ORDER — AMLODIPINE AND VALSARTAN 5; 160 MG/1; MG/1
1 TABLET ORAL DAILY
Qty: 90 TABLET | Refills: 0 | Status: SHIPPED | OUTPATIENT
Start: 2024-06-13

## 2024-06-19 DIAGNOSIS — I10 HYPERTENSION, UNSPECIFIED TYPE: ICD-10-CM

## 2024-06-19 DIAGNOSIS — E78.5 HYPERLIPIDEMIA, UNSPECIFIED HYPERLIPIDEMIA TYPE: ICD-10-CM

## 2024-06-19 DIAGNOSIS — E11.9 TYPE 2 DIABETES MELLITUS WITHOUT COMPLICATION, WITH LONG-TERM CURRENT USE OF INSULIN (MULTI): ICD-10-CM

## 2024-06-19 DIAGNOSIS — Z79.4 TYPE 2 DIABETES MELLITUS WITHOUT COMPLICATION, WITH LONG-TERM CURRENT USE OF INSULIN (MULTI): ICD-10-CM

## 2024-06-19 RX ORDER — AMLODIPINE AND VALSARTAN 5; 160 MG/1; MG/1
1 TABLET ORAL DAILY
Qty: 90 TABLET | Refills: 0 | Status: SHIPPED | OUTPATIENT
Start: 2024-06-19

## 2024-06-24 ENCOUNTER — APPOINTMENT (OUTPATIENT)
Dept: OBSTETRICS AND GYNECOLOGY | Facility: CLINIC | Age: 64
End: 2024-06-24
Payer: COMMERCIAL

## 2024-06-24 VITALS
SYSTOLIC BLOOD PRESSURE: 158 MMHG | HEIGHT: 63 IN | DIASTOLIC BLOOD PRESSURE: 92 MMHG | BODY MASS INDEX: 38.64 KG/M2 | WEIGHT: 218.1 LBS

## 2024-06-24 DIAGNOSIS — E78.5 HYPERLIPIDEMIA, UNSPECIFIED HYPERLIPIDEMIA TYPE: ICD-10-CM

## 2024-06-24 DIAGNOSIS — I10 HYPERTENSION, UNSPECIFIED TYPE: ICD-10-CM

## 2024-06-24 DIAGNOSIS — N76.6 VULVAR ULCER: Primary | ICD-10-CM

## 2024-06-24 DIAGNOSIS — E11.9 TYPE 2 DIABETES MELLITUS WITHOUT COMPLICATION, WITH LONG-TERM CURRENT USE OF INSULIN (MULTI): ICD-10-CM

## 2024-06-24 DIAGNOSIS — Z79.4 TYPE 2 DIABETES MELLITUS WITHOUT COMPLICATION, WITH LONG-TERM CURRENT USE OF INSULIN (MULTI): ICD-10-CM

## 2024-06-24 PROCEDURE — 3080F DIAST BP >= 90 MM HG: CPT | Performed by: OBSTETRICS & GYNECOLOGY

## 2024-06-24 PROCEDURE — 3077F SYST BP >= 140 MM HG: CPT | Performed by: OBSTETRICS & GYNECOLOGY

## 2024-06-24 PROCEDURE — 87529 HSV DNA AMP PROBE: CPT | Performed by: OBSTETRICS & GYNECOLOGY

## 2024-06-24 PROCEDURE — 99204 OFFICE O/P NEW MOD 45 MIN: CPT | Performed by: OBSTETRICS & GYNECOLOGY

## 2024-06-24 PROCEDURE — 99214 OFFICE O/P EST MOD 30 MIN: CPT | Performed by: OBSTETRICS & GYNECOLOGY

## 2024-06-24 RX ORDER — AMLODIPINE AND VALSARTAN 5; 160 MG/1; MG/1
1 TABLET ORAL DAILY
Qty: 90 TABLET | Refills: 0 | Status: SHIPPED | OUTPATIENT
Start: 2024-06-24 | End: 2024-06-28 | Stop reason: SDUPTHER

## 2024-06-24 ASSESSMENT — ENCOUNTER SYMPTOMS
DEPRESSION: 0
LOSS OF SENSATION IN FEET: 0
OCCASIONAL FEELINGS OF UNSTEADINESS: 0

## 2024-06-24 ASSESSMENT — PAIN SCALES - GENERAL: PAINLEVEL: 0-NO PAIN

## 2024-06-24 NOTE — PROGRESS NOTES
GYN OFFICE VISIT    Patient Name:  Shagufta Crowley  :  1960  MR #:  [id number]  Acct #:  [id number]      ASSESSMENT/PLAN:     There are no diagnoses linked to this encounter.     Shagufta was seen today for vaginal bleeding.  Diagnoses and all orders for this visit:  Vulvar ulcer (Primary)  -     HSV PCR, Skin/Mucosa  Hypertension, unspecified type  -     Discontinue: amlodipine-valsartan (Exforge) 5-160 mg tablet; Take 1 tablet by mouth once daily.  Hyperlipidemia, unspecified hyperlipidemia type  -     Discontinue: amlodipine-valsartan (Exforge) 5-160 mg tablet; Take 1 tablet by mouth once daily.  Type 2 diabetes mellitus without complication, with long-term current use of insulin (Multi)  -     Discontinue: amlodipine-valsartan (Exforge) 5-160 mg tablet; Take 1 tablet by mouth once daily.      Vulvar ulcer  Areas of excoriation, with ulceration.  Await HSV cultures.  Aquafor.    HTN (hypertension)  Current rx faxed, 30 day supply.  Pt needs to see pcp.        .All questions answered.  Diagnosis explained in detail, including differential.    No follow-ups on file.      Subjective    Chief Complaint   Patient presents with    Vaginal Bleeding       Shagufta Crowley is a 63 y.o.  No LMP recorded. Patient has had a hysterectomy.   female who presents for evaluation of postmenopausal bleeding.  Spotting noted when toileting.  Hysterectomy more than 20 years ago.  Hx of recent hip surgery.  Sexually active no bleeding with intercourse.  Denies hx of HSV.  Also requests refill for HTN rx, is out x 2 weeks and is having difficulty getting response form PCP office.      Past Medical History:   Diagnosis Date    Hyperlipidemia     Hypertension     Obese     Prediabetes        Past Surgical History:   Procedure Laterality Date    BREAST SURGERY Bilateral 2002    reduction    HYSTERECTOMY  2003    THYROID SURGERY      has ad 3 surgeries between 8970-9089    TOTAL HIP ARTHROPLASTY         Social  History     Socioeconomic History    Marital status:      Spouse name: Not on file    Number of children: 1    Years of education: Not on file    Highest education level: Not on file   Occupational History    Not on file   Tobacco Use    Smoking status: Never    Smokeless tobacco: Never   Vaping Use    Vaping status: Never Used   Substance and Sexual Activity    Alcohol use: Yes     Alcohol/week: 3.0 standard drinks of alcohol     Types: 3 Standard drinks or equivalent per week    Drug use: Never    Sexual activity: Yes     Birth control/protection: Female Sterilization   Other Topics Concern    Not on file   Social History Narrative    Not on file     Social Determinants of Health     Financial Resource Strain: Not on file   Food Insecurity: Not on file   Transportation Needs: No Transportation Needs (2/2/2024)    OASIS : Transportation     Lack of Transportation (Medical): No     Lack of Transportation (Non-Medical): No     Patient Unable or Declines to Respond: No   Physical Activity: Not on file   Stress: Not on file   Social Connections: Feeling Socially Integrated (2/2/2024)    OASIS : Social Isolation     Frequency of experiencing loneliness or isolation: Never   Intimate Partner Violence: Not on file   Housing Stability: Not on file       Family History   Problem Relation Name Age of Onset    Heart disease Mother      Diabetes Father         Prior to Admission medications    Medication Sig Start Date End Date Taking? Authorizing Provider   amlodipine-valsartan (Exforge) 5-160 mg tablet Take 1 tablet by mouth once daily. 6/19/24  Yes Alexis Salinas MD   atorvastatin (Lipitor) 20 mg tablet Take 1 tablet (20 mg) by mouth once daily. 1/2/24  Yes Alexis Salinas MD   traMADol (Ultram) 50 mg tablet Take 2 tablets (100 mg) by mouth every 6 hours if needed for severe pain (7 - 10). 1/9/24  Yes Bello Saleh MD   meloxicam (Mobic) 15 mg tablet Take 1 tablet (15 mg) by mouth once daily.  Patient  "taking differently: Take 1 tablet (15 mg) by mouth if needed. 6/22/23 6/21/24  Rosy De León MD   metFORMIN  mg 24 hr tablet Take 1 tablet (500 mg) by mouth once daily. 1/2/24   Alexis Salinas MD   amlodipine-valsartan (Exforge) 5-160 mg tablet TAKE ONE TABLET BY MOUTH ONCE DAILY 6/13/24 6/19/24  Alexis Salinas MD       No Known Allergies           OBJECTIVE:   BP (!) 158/92   Ht 1.6 m (5' 3\")   Wt 98.9 kg (218 lb 1.6 oz)   BMI 38.63 kg/m²   Body mass index is 38.63 kg/m².     Physical Exam  Constitutional:       General: She is not in acute distress.     Appearance: Normal appearance.   Cardiovascular:      Rate and Rhythm: Normal rate and regular rhythm.      Heart sounds: Normal heart sounds.   Pulmonary:      Effort: Pulmonary effort is normal.      Breath sounds: Normal breath sounds. No wheezing or rhonchi.   Abdominal:      General: Bowel sounds are normal. There is no distension.      Tenderness: There is no abdominal tenderness. There is no guarding.   Genitourinary:     General: Normal vulva.      Pubic Area: No rash.       Labia:         Right: Lesion present. No rash, tenderness or injury.         Left: Lesion present. No rash, tenderness or injury.       Urethra: No prolapse, urethral pain, urethral swelling or urethral lesion.      Vagina: No signs of injury. No vaginal discharge, tenderness, bleeding, lesions or prolapsed vaginal walls (cuff with good support).      Adnexa:         Right: No mass, tenderness or fullness.          Left: No mass, tenderness or fullness.        Comments: Cervix/uterus absent, adnexa not palpable.  Mild vaginal atophy noted.  Ulcerations , small on labia majora with excoriation.  Viral culture obtained.  No urethral polyp or prolapse.  Neurological:      Mental Status: She is alert.             Note: This dictation was generated using Dragon voice recognition software. Please excuse any grammatical or spelling errors that may have occurred using the system.  "

## 2024-06-25 LAB
HSV1 DNA SKIN QL NAA+PROBE: NOT DETECTED
HSV2 DNA SKIN QL NAA+PROBE: NOT DETECTED

## 2024-06-26 ENCOUNTER — TELEPHONE (OUTPATIENT)
Dept: OBSTETRICS AND GYNECOLOGY | Facility: CLINIC | Age: 64
End: 2024-06-26
Payer: COMMERCIAL

## 2024-06-26 NOTE — TELEPHONE ENCOUNTER
Pt called about lab results, informed pt results came back normal. Pt also asked if you would be able to refill her BP medication for one month? Pt said this was something you both spoke about already and you told her you'd try to at least fill for one month. Advised pt to reach out to primary as well

## 2024-06-27 ENCOUNTER — TELEPHONE (OUTPATIENT)
Dept: OBSTETRICS AND GYNECOLOGY | Facility: CLINIC | Age: 64
End: 2024-06-27
Payer: COMMERCIAL

## 2024-06-27 DIAGNOSIS — E78.5 HYPERLIPIDEMIA, UNSPECIFIED HYPERLIPIDEMIA TYPE: ICD-10-CM

## 2024-06-27 DIAGNOSIS — I10 HYPERTENSION, UNSPECIFIED TYPE: ICD-10-CM

## 2024-06-27 DIAGNOSIS — E11.9 TYPE 2 DIABETES MELLITUS WITHOUT COMPLICATION, WITH LONG-TERM CURRENT USE OF INSULIN (MULTI): ICD-10-CM

## 2024-06-27 DIAGNOSIS — Z79.4 TYPE 2 DIABETES MELLITUS WITHOUT COMPLICATION, WITH LONG-TERM CURRENT USE OF INSULIN (MULTI): ICD-10-CM

## 2024-06-28 RX ORDER — AMLODIPINE AND VALSARTAN 5; 160 MG/1; MG/1
1 TABLET ORAL DAILY
Qty: 90 TABLET | Refills: 0 | Status: SHIPPED | OUTPATIENT
Start: 2024-06-28

## 2024-08-21 ENCOUNTER — APPOINTMENT (OUTPATIENT)
Dept: OBSTETRICS AND GYNECOLOGY | Facility: CLINIC | Age: 64
End: 2024-08-21
Payer: COMMERCIAL

## 2024-08-22 ENCOUNTER — APPOINTMENT (OUTPATIENT)
Dept: PRIMARY CARE | Facility: CLINIC | Age: 64
End: 2024-08-22
Payer: COMMERCIAL

## 2024-08-29 ENCOUNTER — APPOINTMENT (OUTPATIENT)
Dept: PRIMARY CARE | Facility: CLINIC | Age: 64
End: 2024-08-29
Payer: COMMERCIAL

## 2024-08-29 VITALS
HEART RATE: 88 BPM | DIASTOLIC BLOOD PRESSURE: 86 MMHG | OXYGEN SATURATION: 97 % | TEMPERATURE: 98.1 F | HEIGHT: 62 IN | WEIGHT: 218 LBS | SYSTOLIC BLOOD PRESSURE: 138 MMHG | BODY MASS INDEX: 40.12 KG/M2

## 2024-08-29 DIAGNOSIS — R05.1 ACUTE COUGH: ICD-10-CM

## 2024-08-29 DIAGNOSIS — J02.9 ACUTE SORE THROAT: Primary | ICD-10-CM

## 2024-08-29 DIAGNOSIS — R53.83 OTHER FATIGUE: ICD-10-CM

## 2024-08-29 DIAGNOSIS — E11.10 DM (DIABETES MELLITUS) TYPE 2, UNCONTROLLED, WITH KETOACIDOSIS (MULTI): ICD-10-CM

## 2024-08-29 LAB
POC BINAX EXPIRATION: ABNORMAL
POC BINAX NOW COVID SERIAL NUMBER: ABNORMAL
POC RAPID STREP: NEGATIVE
POC SARS-COV-2 AG BINAX: ABNORMAL

## 2024-08-29 PROCEDURE — 3079F DIAST BP 80-89 MM HG: CPT | Performed by: INTERNAL MEDICINE

## 2024-08-29 PROCEDURE — 3008F BODY MASS INDEX DOCD: CPT | Performed by: INTERNAL MEDICINE

## 2024-08-29 PROCEDURE — 99214 OFFICE O/P EST MOD 30 MIN: CPT | Performed by: INTERNAL MEDICINE

## 2024-08-29 PROCEDURE — 87880 STREP A ASSAY W/OPTIC: CPT | Performed by: INTERNAL MEDICINE

## 2024-08-29 PROCEDURE — 3075F SYST BP GE 130 - 139MM HG: CPT | Performed by: INTERNAL MEDICINE

## 2024-08-29 PROCEDURE — 87811 SARS-COV-2 COVID19 W/OPTIC: CPT | Performed by: INTERNAL MEDICINE

## 2024-08-29 RX ORDER — GLIMEPIRIDE 4 MG/1
4 TABLET ORAL
Qty: 30 TABLET | Refills: 1 | Status: SHIPPED | OUTPATIENT
Start: 2024-08-29 | End: 2025-08-29

## 2024-08-29 RX ORDER — LIDOCAINE HYDROCHLORIDE 20 MG/ML
5 SOLUTION OROPHARYNGEAL 3 TIMES DAILY
Qty: 20 ML | Refills: 0 | Status: SHIPPED | OUTPATIENT
Start: 2024-08-29 | End: 2024-09-01

## 2024-08-29 RX ORDER — BENZONATATE 100 MG/1
100 CAPSULE ORAL 3 TIMES DAILY PRN
Qty: 30 CAPSULE | Refills: 0 | Status: SHIPPED | OUTPATIENT
Start: 2024-08-29 | End: 2024-09-08

## 2024-08-29 ASSESSMENT — PATIENT HEALTH QUESTIONNAIRE - PHQ9
2. FEELING DOWN, DEPRESSED OR HOPELESS: NOT AT ALL
SUM OF ALL RESPONSES TO PHQ9 QUESTIONS 1 AND 2: 0
1. LITTLE INTEREST OR PLEASURE IN DOING THINGS: NOT AT ALL

## 2024-08-29 ASSESSMENT — ENCOUNTER SYMPTOMS
DEPRESSION: 0
OCCASIONAL FEELINGS OF UNSTEADINESS: 0
LOSS OF SENSATION IN FEET: 0

## 2024-08-29 ASSESSMENT — PAIN SCALES - GENERAL: PAINLEVEL: 0-NO PAIN

## 2024-08-29 NOTE — PROGRESS NOTES
"Subjective   Patient ID: Shagufta Crowley is a 64 y.o. female who presents for Follow-up, Sore Throat (Going on for 10 days. ), Diarrhea (Only for a day. ), Fatigue, and Night Sweats.    HPI Patient presents to clinic as problem visit because of sore throat, mild headache and nonproductive cough going on for the past 1 week.  She also had diarrhea yesterday which seems to have resolved.  She denies any fever, chills, shortness of breath and chest tightness.  Her symptoms are associated with fatigability.   She has a history of arthritis of hips, hypertension, hyperlipidemia, DJD of knees,  and prediabetes .  Review of Systems   Constitutional:  Positive for fatigue.   HENT:  Positive for sore throat.    Eyes: Negative.    Respiratory: Negative.     Cardiovascular: Negative.    Gastrointestinal: Negative.    Endocrine: Negative.    Genitourinary: Negative.    Musculoskeletal: Negative.    Skin: Negative.    Allergic/Immunologic: Negative.    Neurological: Negative.    Hematological: Negative.    Psychiatric/Behavioral: Negative.         Objective   /86 (BP Location: Right arm, Patient Position: Sitting)   Pulse 88   Temp 36.7 °C (98.1 °F) (Oral)   Ht 1.575 m (5' 2\")   Wt 98.9 kg (218 lb)   SpO2 97%   BMI 39.87 kg/m²     Physical Exam  Constitutional:       Appearance: Normal appearance. She is obese.   HENT:      Right Ear: Tympanic membrane normal.      Left Ear: Tympanic membrane and ear canal normal.      Nose: Nose normal.   Neck:      Vascular: No carotid bruit.   Cardiovascular:      Rate and Rhythm: Normal rate.   Pulmonary:      Effort: No respiratory distress.      Breath sounds: No stridor. No wheezing.   Abdominal:      Palpations: Abdomen is soft.      Tenderness: There is no abdominal tenderness. There is no guarding or rebound.   Skin:     Coloration: Skin is not jaundiced.      Findings: No bruising.   Neurological:      General: No focal deficit present.      Mental Status: She is " alert and oriented to person, place, and time.   Psychiatric:         Mood and Affect: Mood normal.         Assessment/Plan    patient is advised to do gargle with salt water 4 times daily and will be started on viscous lidocaine and Tessalon Perles regarding her sore throat and cough.  She is encouraged to drink fluids and we will test her for strep throat and COVID-19.  She will be scheduled for routine blood work.  She will be notified about test results and will make further recommendations.

## 2024-08-29 NOTE — LETTER
August 29, 2024     Patient: Shagufta Crowley   YOB: 1960   Date of Visit: 8/29/2024       To Whom It May Concern:    Shagufta Crowley was seen in my clinic on 8/29/2024 at 11:40 am. Please excuse Shagufta for her absence from work starting 8/29/24 till 8/30/24 due to her recent illness.    If you have any questions or concerns, please don't hesitate to call.         Sincerely,         Alexis Salinas MD        CC: No Recipients

## 2024-08-29 NOTE — LETTER
August 29, 2024     Patient: Shagufta Jeni Foster   YOB: 1960   Date of Visit: 8/29/2024       To Whom It May Concern:    It is my medical opinion that Shagufta Jeni Foster may return to full duty immediately with no restrictions on 9/2/24.    If you have any questions or concerns, please don't hesitate to call.         Sincerely,        Alexis Salinas MD    CC: No Recipients

## 2024-08-29 NOTE — LETTER
Dr Carter Money do you have a copy of the sleep study?   Im not seeing it in the chart September 1, 2024     Patient: Shagufta Jeni Foster   YOB: 1960   Date of Visit: 8/29/2024       To Whom It May Concern:    It is my medical opinion that Shagufta Jeni Foster may return to full duty immediately with no restrictions on 9/3/24.She was seen in the office on 8/29/24 and tested positive for covid-19.    If you have any questions or concerns, please don't hesitate to call.         Sincerely,        Alexis Salinas MD    CC: No Recipients

## 2024-08-30 ENCOUNTER — TELEPHONE (OUTPATIENT)
Dept: PRIMARY CARE | Facility: CLINIC | Age: 64
End: 2024-08-30
Payer: COMMERCIAL

## 2024-09-01 ASSESSMENT — ENCOUNTER SYMPTOMS
EYES NEGATIVE: 1
MUSCULOSKELETAL NEGATIVE: 1
HEMATOLOGIC/LYMPHATIC NEGATIVE: 1
CARDIOVASCULAR NEGATIVE: 1
ALLERGIC/IMMUNOLOGIC NEGATIVE: 1
FATIGUE: 1
NEUROLOGICAL NEGATIVE: 1
ENDOCRINE NEGATIVE: 1
GASTROINTESTINAL NEGATIVE: 1
PSYCHIATRIC NEGATIVE: 1
SORE THROAT: 1
RESPIRATORY NEGATIVE: 1

## 2024-09-04 ENCOUNTER — TELEPHONE (OUTPATIENT)
Dept: PRIMARY CARE | Facility: CLINIC | Age: 64
End: 2024-09-04
Payer: COMMERCIAL

## 2024-09-16 ENCOUNTER — OFFICE VISIT (OUTPATIENT)
Dept: OBSTETRICS AND GYNECOLOGY | Facility: CLINIC | Age: 64
End: 2024-09-16
Payer: COMMERCIAL

## 2024-09-16 VITALS
BODY MASS INDEX: 40.46 KG/M2 | HEIGHT: 62 IN | WEIGHT: 219.9 LBS | SYSTOLIC BLOOD PRESSURE: 120 MMHG | DIASTOLIC BLOOD PRESSURE: 90 MMHG

## 2024-09-16 DIAGNOSIS — Z90.710 S/P HYSTERECTOMY: ICD-10-CM

## 2024-09-16 DIAGNOSIS — Z12.31 SCREENING MAMMOGRAM FOR BREAST CANCER: ICD-10-CM

## 2024-09-16 DIAGNOSIS — Z01.419 WELL WOMAN EXAM WITH ROUTINE GYNECOLOGICAL EXAM: Primary | ICD-10-CM

## 2024-09-16 PROCEDURE — 3080F DIAST BP >= 90 MM HG: CPT | Performed by: OBSTETRICS & GYNECOLOGY

## 2024-09-16 PROCEDURE — 99396 PREV VISIT EST AGE 40-64: CPT | Performed by: OBSTETRICS & GYNECOLOGY

## 2024-09-16 PROCEDURE — 3074F SYST BP LT 130 MM HG: CPT | Performed by: OBSTETRICS & GYNECOLOGY

## 2024-09-16 PROCEDURE — 3008F BODY MASS INDEX DOCD: CPT | Performed by: OBSTETRICS & GYNECOLOGY

## 2024-09-16 ASSESSMENT — ENCOUNTER SYMPTOMS
DEPRESSION: 0
OCCASIONAL FEELINGS OF UNSTEADINESS: 0
LOSS OF SENSATION IN FEET: 0

## 2024-09-16 ASSESSMENT — PATIENT HEALTH QUESTIONNAIRE - PHQ9
1. LITTLE INTEREST OR PLEASURE IN DOING THINGS: NOT AT ALL
2. FEELING DOWN, DEPRESSED OR HOPELESS: NOT AT ALL
SUM OF ALL RESPONSES TO PHQ9 QUESTIONS 1 AND 2: 0

## 2024-09-16 ASSESSMENT — PAIN SCALES - GENERAL: PAINLEVEL: 0-NO PAIN

## 2024-09-16 NOTE — PROGRESS NOTES
Subjective   Shaguftajoan Crowley is a 64 y.o.  female who presents for annual exam. The patient has no complaints today. The patient is sexually active. GYN screening history: last pap: was normal. The patient is not taking hormone replacement therapy. Patient denies post-menopausal vaginal bleeding.. The patient wears seatbelts: yes. The patient participates in regular exercise: yes. Has the patient ever been transfused or tattooed?: not asked. The patient reports that there is not domestic violence in their life.     Menstrual History:  OB History          3    Para   1    Term                AB   2    Living   1         SAB   1    IAB   1    Ectopic        Multiple        Live Births   1                  No LMP recorded. Patient has had a hysterectomy.         Past Medical History:   Diagnosis Date    Hyperlipidemia     Hypertension     Obese     Prediabetes        Past Surgical History:   Procedure Laterality Date    BREAST SURGERY Bilateral     reduction    HYSTERECTOMY      THYROID SURGERY      has ad 3 surgeries between 3526-8254    TOTAL HIP ARTHROPLASTY          No Known Allergies      Family History   Problem Relation Name Age of Onset    Heart disease Mother      Diabetes Father          Social History     Socioeconomic History    Marital status:      Spouse name: Not on file    Number of children: 1    Years of education: Not on file    Highest education level: Not on file   Occupational History    Not on file   Tobacco Use    Smoking status: Never    Smokeless tobacco: Never   Vaping Use    Vaping status: Never Used   Substance and Sexual Activity    Alcohol use: Yes     Alcohol/week: 1.0 standard drink of alcohol     Types: 1 Standard drinks or equivalent per week    Drug use: Never    Sexual activity: Yes     Birth control/protection: Female Sterilization   Other Topics Concern    Not on file   Social History Narrative    Not on file     Social Determinants  "of Health     Financial Resource Strain: Not on file   Food Insecurity: Not on file   Transportation Needs: No Transportation Needs (2/2/2024)    OASIS : Transportation     Lack of Transportation (Medical): No     Lack of Transportation (Non-Medical): No     Patient Unable or Declines to Respond: No   Physical Activity: Not on file   Stress: Not on file   Social Connections: Feeling Socially Integrated (2/2/2024)    OASIS : Social Isolation     Frequency of experiencing loneliness or isolation: Never   Intimate Partner Violence: Not on file   Housing Stability: Not on file            Objective   /90   Ht 1.575 m (5' 2\")   Wt 99.7 kg (219 lb 14.4 oz) Comment: WITH SHOES  BMI 40.22 kg/m²     Physical Exam  Vitals and nursing note reviewed.   Constitutional:       General: She is not in acute distress.     Appearance: Normal appearance. She is not ill-appearing or toxic-appearing.   HENT:      Head: Normocephalic and atraumatic.      Mouth/Throat:      Mouth: Mucous membranes are moist.      Pharynx: Oropharynx is clear.   Eyes:      Extraocular Movements: Extraocular movements intact.      Conjunctiva/sclera: Conjunctivae normal.      Pupils: Pupils are equal, round, and reactive to light.   Neck:      Thyroid: No thyroid mass, thyromegaly or thyroid tenderness.   Cardiovascular:      Rate and Rhythm: Normal rate.      Pulses: Normal pulses.      Heart sounds: Normal heart sounds. No murmur heard.  Pulmonary:      Effort: Pulmonary effort is normal. No respiratory distress.      Breath sounds: Normal breath sounds. No wheezing or rhonchi.   Chest:   Breasts:     Right: Normal. No swelling, bleeding, inverted nipple, mass, nipple discharge, skin change or tenderness.      Left: Normal. No swelling, bleeding, inverted nipple, mass, nipple discharge, skin change or tenderness.   Abdominal:      General: Bowel sounds are normal. There is no distension.      Palpations: Abdomen is soft. There is no mass. "      Tenderness: There is no abdominal tenderness. There is no guarding or rebound.      Hernia: No hernia is present. There is no hernia in the left inguinal area or right inguinal area.   Genitourinary:     General: Normal vulva.      Pubic Area: No rash.       Labia:         Right: No rash, tenderness, lesion or injury.         Left: No rash, tenderness, lesion or injury.       Urethra: No prolapse or urethral lesion.      Vagina: No signs of injury. No vaginal discharge, erythema, tenderness, bleeding, lesions or prolapsed vaginal walls.      Comments: S/p hysterectomy, vaginal cuff intact, good support.  No pelvic organ prolapse, cervix and uterus absent.  Adnexa not palpable.  Musculoskeletal:         General: No swelling, tenderness or deformity. Normal range of motion.   Lymphadenopathy:      Upper Body:      Right upper body: No axillary adenopathy.      Left upper body: No axillary adenopathy.      Lower Body: No right inguinal adenopathy. No left inguinal adenopathy.   Skin:     General: Skin is warm and dry.      Findings: No lesion.   Neurological:      General: No focal deficit present.      Mental Status: She is alert and oriented to person, place, and time.      Motor: No weakness.      Coordination: Coordination normal.   Psychiatric:         Mood and Affect: Mood normal.         Behavior: Behavior normal.         Thought Content: Thought content normal.         Judgment: Judgment normal.            Assessment/Plan   Problem List Items Addressed This Visit    None  Visit Diagnoses       Well woman exam with routine gynecological exam    -  Primary    Screening mammogram for breast cancer        Relevant Orders    BI mammo bilateral screening tomosynthesis    S/P hysterectomy                   All questions answered.  Breast self exam technique reviewed and patient encouraged to perform self-exam monthly.  Diagnosis explained in detail, including differential.  Discussed healthy lifestyle  modifications.  Mammogram.

## 2024-09-18 ENCOUNTER — HOSPITAL ENCOUNTER (OUTPATIENT)
Dept: RADIOLOGY | Facility: CLINIC | Age: 64
Discharge: HOME | End: 2024-09-18
Payer: COMMERCIAL

## 2024-09-18 VITALS — HEIGHT: 62 IN | WEIGHT: 218 LBS | BODY MASS INDEX: 40.12 KG/M2

## 2024-09-18 DIAGNOSIS — Z12.31 SCREENING MAMMOGRAM FOR BREAST CANCER: ICD-10-CM

## 2024-09-18 PROCEDURE — 77067 SCR MAMMO BI INCL CAD: CPT

## 2024-10-23 ENCOUNTER — APPOINTMENT (OUTPATIENT)
Dept: PRIMARY CARE | Facility: CLINIC | Age: 64
End: 2024-10-23
Payer: COMMERCIAL

## 2025-02-27 ENCOUNTER — APPOINTMENT (OUTPATIENT)
Dept: PRIMARY CARE | Facility: CLINIC | Age: 65
End: 2025-02-27
Payer: COMMERCIAL

## 2025-02-27 VITALS
BODY MASS INDEX: 40.48 KG/M2 | HEART RATE: 88 BPM | DIASTOLIC BLOOD PRESSURE: 96 MMHG | HEIGHT: 62 IN | WEIGHT: 220 LBS | SYSTOLIC BLOOD PRESSURE: 150 MMHG | OXYGEN SATURATION: 100 %

## 2025-02-27 DIAGNOSIS — J06.9 UPPER RESPIRATORY TRACT INFECTION, UNSPECIFIED TYPE: Primary | ICD-10-CM

## 2025-02-27 DIAGNOSIS — E78.5 HYPERLIPIDEMIA, UNSPECIFIED HYPERLIPIDEMIA TYPE: ICD-10-CM

## 2025-02-27 DIAGNOSIS — E11.10 DM (DIABETES MELLITUS) TYPE 2, UNCONTROLLED, WITH KETOACIDOSIS: ICD-10-CM

## 2025-02-27 DIAGNOSIS — E11.9 TYPE 2 DIABETES MELLITUS WITHOUT COMPLICATION, WITH LONG-TERM CURRENT USE OF INSULIN (MULTI): ICD-10-CM

## 2025-02-27 DIAGNOSIS — I10 HYPERTENSION, UNSPECIFIED TYPE: ICD-10-CM

## 2025-02-27 DIAGNOSIS — Z79.4 TYPE 2 DIABETES MELLITUS WITHOUT COMPLICATION, WITH LONG-TERM CURRENT USE OF INSULIN (MULTI): ICD-10-CM

## 2025-02-27 DIAGNOSIS — E78.5 DYSLIPIDEMIA: ICD-10-CM

## 2025-02-27 PROCEDURE — 3080F DIAST BP >= 90 MM HG: CPT | Performed by: INTERNAL MEDICINE

## 2025-02-27 PROCEDURE — 3077F SYST BP >= 140 MM HG: CPT | Performed by: INTERNAL MEDICINE

## 2025-02-27 PROCEDURE — 99214 OFFICE O/P EST MOD 30 MIN: CPT | Performed by: INTERNAL MEDICINE

## 2025-02-27 PROCEDURE — 3008F BODY MASS INDEX DOCD: CPT | Performed by: INTERNAL MEDICINE

## 2025-02-27 RX ORDER — GLIMEPIRIDE 4 MG/1
4 TABLET ORAL
Qty: 90 TABLET | Refills: 1 | Status: SHIPPED | OUTPATIENT
Start: 2025-02-27 | End: 2026-02-27

## 2025-02-27 RX ORDER — AMLODIPINE AND VALSARTAN 5; 160 MG/1; MG/1
1 TABLET ORAL DAILY
Qty: 90 TABLET | Refills: 0 | Status: SHIPPED | OUTPATIENT
Start: 2025-02-27

## 2025-02-27 RX ORDER — CEFDINIR 300 MG/1
300 CAPSULE ORAL 2 TIMES DAILY
Qty: 14 CAPSULE | Refills: 0 | Status: SHIPPED | OUTPATIENT
Start: 2025-02-27 | End: 2025-03-06

## 2025-02-27 RX ORDER — ATORVASTATIN CALCIUM 20 MG/1
20 TABLET, FILM COATED ORAL DAILY
Qty: 90 TABLET | Refills: 0 | Status: SHIPPED | OUTPATIENT
Start: 2025-02-27

## 2025-02-27 RX ORDER — LEVOCETIRIZINE DIHYDROCHLORIDE 5 MG/1
5 TABLET, FILM COATED ORAL EVERY EVENING
Qty: 14 TABLET | Refills: 0 | Status: SHIPPED | OUTPATIENT
Start: 2025-02-27 | End: 2025-03-13

## 2025-02-27 ASSESSMENT — ENCOUNTER SYMPTOMS
DEPRESSION: 0
LOSS OF SENSATION IN FEET: 0
OCCASIONAL FEELINGS OF UNSTEADINESS: 0

## 2025-02-27 NOTE — PROGRESS NOTES
"Subjective   Patient ID: Shagufta Crowley is a 64 y.o. female who presents for Cough (Coughing up phlegm (clear,green)) and Sinus Problem.    HPI patient presents to clinic as problem visit because she has been complaining of cough congestion with greenish sputum production  for the past 1 week.  Her symptoms are associated with sinus headache antibiotic.  She denies any fever, chills, shortness of breath, chest tightness and wheezing.  She is also requesting her blood work regarding her history of hypertension and type 2 diabetes.  She has   history of arthritis of hips, hypertension, hyperlipidemia, DJD of knees,  and prediabetes .     Review of Systems   Constitutional: Negative.    HENT:  Positive for congestion.    Eyes: Negative.    Respiratory:  Positive for cough.    Cardiovascular: Negative.    Gastrointestinal: Negative.    Endocrine: Negative.    Genitourinary: Negative.    Musculoskeletal: Negative.    Skin: Negative.    Allergic/Immunologic: Negative.    Neurological: Negative.    Hematological: Negative.    Psychiatric/Behavioral: Negative.         Objective   BP (!) 150/96 (BP Location: Left arm, Patient Position: Sitting)   Pulse 88   Ht 1.575 m (5' 2\")   Wt 99.8 kg (220 lb)   SpO2 100%   BMI 40.24 kg/m²     Physical Exam  Constitutional:       Appearance: Normal appearance. She is obese.   HENT:      Right Ear: Tympanic membrane normal.      Left Ear: Tympanic membrane and ear canal normal.      Nose: Nose normal.   Neck:      Vascular: No carotid bruit.   Cardiovascular:      Rate and Rhythm: Normal rate.   Pulmonary:      Effort: No respiratory distress.      Breath sounds: No stridor. No wheezing.   Abdominal:      Palpations: Abdomen is soft.      Tenderness: There is no abdominal tenderness. There is no guarding or rebound.   Skin:     Coloration: Skin is not jaundiced.      Findings: No bruising.   Neurological:      General: No focal deficit present.      Mental Status: She is alert " and oriented to person, place, and time.   Psychiatric:         Mood and Affect: Mood normal.         Assessment/Plan   Assessment & Plan  Upper respiratory tract infection, unspecified type  Patient is encouraged to drink fluids and is started on levocetirizine and Omnicef regarding upper respiratory infection.  Orders:    cefdinir (Omnicef) 300 mg capsule; Take 1 capsule (300 mg) by mouth 2 times a day for 7 days.    levocetirizine (Xyzal) 5 mg tablet; Take 1 tablet (5 mg) by mouth once daily in the evening for 14 days.    Hypertension, unspecified type  She will continue Exforge regarding history of hypertension.  Orders:    amlodipine-valsartan (Exforge) 5-160 mg tablet; Take 1 tablet by mouth once daily.    atorvastatin (Lipitor) 20 mg tablet; Take 1 tablet (20 mg) by mouth once daily.    Hyperlipidemia, unspecified hyperlipidemia type  She will continue atorvastatin regarding dyslipidemia.  Orders:    amlodipine-valsartan (Exforge) 5-160 mg tablet; Take 1 tablet by mouth once daily.    atorvastatin (Lipitor) 20 mg tablet; Take 1 tablet (20 mg) by mouth once daily.    Type 2 diabetes mellitus without complication, with long-term current use of insulin (Multi)  She will continue glimepiride regarding history of type 2 diabetes.  She will be scheduled for routine blood work.  She is advised to return to clinic in 1 month for follow-up visit.  Orders:    amlodipine-valsartan (Exforge) 5-160 mg tablet; Take 1 tablet by mouth once daily.    atorvastatin (Lipitor) 20 mg tablet; Take 1 tablet (20 mg) by mouth once daily.    Hemoglobin A1c; Future    Basic metabolic panel; Future    Albumin-Creatinine Ratio, Urine Random; Future    Urinalysis with Reflex Microscopic; Future    Dyslipidemia  Will check lipid panel  Orders:    Lipid Panel; Future    DM (diabetes mellitus) type 2, uncontrolled, with ketoacidosis  When turning her  Orders:    glimepiride (AmaryL) 4 mg tablet; Take 1 tablet (4 mg) by mouth once daily in the  morning. Take before meals.

## 2025-02-27 NOTE — ASSESSMENT & PLAN NOTE
She will continue atorvastatin regarding dyslipidemia.  Orders:    amlodipine-valsartan (Exforge) 5-160 mg tablet; Take 1 tablet by mouth once daily.    atorvastatin (Lipitor) 20 mg tablet; Take 1 tablet (20 mg) by mouth once daily.

## 2025-02-27 NOTE — ASSESSMENT & PLAN NOTE
She will continue Exforge regarding history of hypertension.  Orders:    amlodipine-valsartan (Exforge) 5-160 mg tablet; Take 1 tablet by mouth once daily.    atorvastatin (Lipitor) 20 mg tablet; Take 1 tablet (20 mg) by mouth once daily.

## 2025-02-28 ASSESSMENT — ENCOUNTER SYMPTOMS
ENDOCRINE NEGATIVE: 1
ALLERGIC/IMMUNOLOGIC NEGATIVE: 1
PSYCHIATRIC NEGATIVE: 1
MUSCULOSKELETAL NEGATIVE: 1
GASTROINTESTINAL NEGATIVE: 1
EYES NEGATIVE: 1
CARDIOVASCULAR NEGATIVE: 1
COUGH: 1
HEMATOLOGIC/LYMPHATIC NEGATIVE: 1
NEUROLOGICAL NEGATIVE: 1
CONSTITUTIONAL NEGATIVE: 1

## 2025-03-27 ENCOUNTER — APPOINTMENT (OUTPATIENT)
Dept: PRIMARY CARE | Facility: CLINIC | Age: 65
End: 2025-03-27
Payer: COMMERCIAL

## 2025-06-03 DIAGNOSIS — Z79.4 TYPE 2 DIABETES MELLITUS WITHOUT COMPLICATION, WITH LONG-TERM CURRENT USE OF INSULIN: ICD-10-CM

## 2025-06-03 DIAGNOSIS — E11.9 TYPE 2 DIABETES MELLITUS WITHOUT COMPLICATION, WITH LONG-TERM CURRENT USE OF INSULIN: ICD-10-CM

## 2025-06-03 DIAGNOSIS — I10 HYPERTENSION, UNSPECIFIED TYPE: ICD-10-CM

## 2025-06-03 DIAGNOSIS — E78.5 HYPERLIPIDEMIA, UNSPECIFIED HYPERLIPIDEMIA TYPE: ICD-10-CM

## 2025-06-05 RX ORDER — ATORVASTATIN CALCIUM 20 MG/1
20 TABLET, FILM COATED ORAL DAILY
Qty: 90 TABLET | Refills: 0 | Status: SHIPPED | OUTPATIENT
Start: 2025-06-05

## 2025-06-05 RX ORDER — AMLODIPINE AND VALSARTAN 5; 160 MG/1; MG/1
1 TABLET ORAL DAILY
Qty: 90 TABLET | Refills: 0 | Status: SHIPPED | OUTPATIENT
Start: 2025-06-05

## 2025-08-03 ENCOUNTER — ANCILLARY PROCEDURE (OUTPATIENT)
Dept: URGENT CARE | Age: 65
End: 2025-08-03
Payer: COMMERCIAL

## 2025-08-03 ENCOUNTER — OFFICE VISIT (OUTPATIENT)
Dept: URGENT CARE | Age: 65
End: 2025-08-03
Payer: COMMERCIAL

## 2025-08-03 VITALS
HEART RATE: 83 BPM | HEIGHT: 62 IN | BODY MASS INDEX: 36.8 KG/M2 | DIASTOLIC BLOOD PRESSURE: 80 MMHG | OXYGEN SATURATION: 98 % | SYSTOLIC BLOOD PRESSURE: 150 MMHG | WEIGHT: 200 LBS | RESPIRATION RATE: 20 BRPM | TEMPERATURE: 98.3 F

## 2025-08-03 DIAGNOSIS — R31.9 HEMATURIA, UNSPECIFIED TYPE: ICD-10-CM

## 2025-08-03 DIAGNOSIS — R82.90 CLOUDY URINE: ICD-10-CM

## 2025-08-03 DIAGNOSIS — I10 PRIMARY HYPERTENSION: ICD-10-CM

## 2025-08-03 DIAGNOSIS — R05.1 ACUTE COUGH: ICD-10-CM

## 2025-08-03 DIAGNOSIS — J40 BRONCHITIS: Primary | ICD-10-CM

## 2025-08-03 DIAGNOSIS — M54.6 ACUTE RIGHT-SIDED THORACIC BACK PAIN: ICD-10-CM

## 2025-08-03 LAB
POC APPEARANCE, URINE: ABNORMAL
POC BILIRUBIN, URINE: NEGATIVE
POC BLOOD, URINE: ABNORMAL
POC COLOR, URINE: YELLOW
POC GLUCOSE, URINE: NEGATIVE MG/DL
POC KETONES, URINE: NEGATIVE MG/DL
POC LEUKOCYTES, URINE: NEGATIVE
POC NITRITE,URINE: NEGATIVE
POC PH, URINE: 5.5 PH
POC PROTEIN, URINE: NEGATIVE MG/DL
POC SPECIFIC GRAVITY, URINE: 1.02
POC UROBILINOGEN, URINE: 0.2 EU/DL

## 2025-08-03 PROCEDURE — 3008F BODY MASS INDEX DOCD: CPT | Performed by: PHYSICIAN ASSISTANT

## 2025-08-03 PROCEDURE — 71046 X-RAY EXAM CHEST 2 VIEWS: CPT | Performed by: PHYSICIAN ASSISTANT

## 2025-08-03 PROCEDURE — 81003 URINALYSIS AUTO W/O SCOPE: CPT | Performed by: PHYSICIAN ASSISTANT

## 2025-08-03 PROCEDURE — 3079F DIAST BP 80-89 MM HG: CPT | Performed by: PHYSICIAN ASSISTANT

## 2025-08-03 PROCEDURE — 3077F SYST BP >= 140 MM HG: CPT | Performed by: PHYSICIAN ASSISTANT

## 2025-08-03 PROCEDURE — 99204 OFFICE O/P NEW MOD 45 MIN: CPT | Performed by: PHYSICIAN ASSISTANT

## 2025-08-03 PROCEDURE — 1036F TOBACCO NON-USER: CPT | Performed by: PHYSICIAN ASSISTANT

## 2025-08-03 RX ORDER — BENZONATATE 200 MG/1
200 CAPSULE ORAL 3 TIMES DAILY PRN
Qty: 30 CAPSULE | Refills: 0 | Status: SHIPPED | OUTPATIENT
Start: 2025-08-03

## 2025-08-03 ASSESSMENT — PATIENT HEALTH QUESTIONNAIRE - PHQ9
SUM OF ALL RESPONSES TO PHQ9 QUESTIONS 1 AND 2: 0
1. LITTLE INTEREST OR PLEASURE IN DOING THINGS: NOT AT ALL
2. FEELING DOWN, DEPRESSED OR HOPELESS: NOT AT ALL

## 2025-08-03 ASSESSMENT — ENCOUNTER SYMPTOMS
DEPRESSION: 0
OCCASIONAL FEELINGS OF UNSTEADINESS: 0
COUGH: 1
HEADACHES: 1
LOSS OF SENSATION IN FEET: 0

## 2025-08-03 NOTE — PROGRESS NOTES
"Subjective   Patient ID: Shagufta Crowley is a 64 y.o. female. They present today with a chief complaint of Cough (Had a cold over a week ago. Severe cough and chest congestion that started x 3 days ago. ) and Headache.    History of Present Illness    Cough  Associated symptoms include headaches.   Headache  Associated symptoms: cough      This is a 64-year-old female presents urgent care for multiple complaints.  1 week ago she started with cold-like symptoms.  Her granddaughter was sick with similar symptoms.  Symptoms have been ongoing for 7 to 8 days now.  She does have a cough and right sided mid back pain.  She is bringing up phlegm with the cough.  States she does have a headache which was not thunderclap onset or worst headache of life.  She noticed her urine was chaya.  Denies frequency, urgency, dysuria, hematuria.  No history of kidney stones.  States she is a prediabetic.  Past Medical History  Allergies as of 08/03/2025    (No Known Allergies)       Prescriptions Prior to Admission[1]     Medical History[2]    Surgical History[3]     reports that she has never smoked. She has never used smokeless tobacco. She reports current alcohol use of about 1.0 standard drink of alcohol per week. She reports that she does not use drugs.    Review of Systems  Review of Systems   Respiratory:  Positive for cough.    Neurological:  Positive for headaches.   All other systems reviewed and are negative.                                 Objective    Vitals:    08/03/25 0911   BP: 150/80   BP Location: Right arm   Patient Position: Sitting   BP Cuff Size: Large adult   Pulse: 83   Resp: 20   Temp: 36.8 °C (98.3 °F)   TempSrc: Oral   SpO2: 98%   Weight: 90.7 kg (200 lb)   Height: 1.575 m (5' 2\")     No LMP recorded. Patient has had a hysterectomy.    Physical Exam  Physical Exam:    General: Vitals noted no distress. Afebrile. Normal phonation, no stridor, no trismus    ENT: Left TM is unremarkable. Right TM is " unremarkable. Left external auditory canal is unremarkable. Right external auditory canal is unremarkable. Mastoids nontender. Normal conjunctival. Eyes are PERRLA. Posterior oropharynx without exudate or swelling.     Neck: Supple. No anterior cervical lymphadenopathy. No posterior cervical chain lymphadenopathy    Cardiac: Regular rate rhythm    Lungs: Good aeration throughout. No adventitious breath sounds.    Abdomen: Soft, nontender, nonsurgical throughout.     Back: No CVA tenderness.  Mild tenderness over the right lower posterior ribs.  No rash.    Extremities: No peripheral edema    Skin: No rash    Neuro: No gross neurological deficits      Procedures    Point of Care Test & Imaging Results from this visit  Results for orders placed or performed in visit on 08/03/25   POCT UA Automated manually resulted   Result Value Ref Range    POC Color, Urine Yellow Straw, Yellow, Light-Yellow    POC Appearance, Urine Cloudy (A) Clear    POC Glucose, Urine NEGATIVE NEGATIVE mg/dl    POC Bilirubin, Urine NEGATIVE NEGATIVE    POC Ketones, Urine NEGATIVE NEGATIVE mg/dl    POC Specific Gravity, Urine 1.025 1.005 - 1.035    POC Blood, Urine LARGE (3+) (A) NEGATIVE    POC PH, Urine 5.5 No Reference Range Established PH    POC Protein, Urine NEGATIVE NEGATIVE mg/dl    POC Urobilinogen, Urine 0.2 0.2, 1.0 EU/DL    Poc Nitrite, Urine NEGATIVE NEGATIVE    POC Leukocytes, Urine NEGATIVE NEGATIVE      Imaging  XR chest 2 views  Result Date: 8/3/2025  No acute cardiopulmonary disease.   MACRO: none   Signed by: Silvia Licea 8/3/2025 9:51 AM Dictation workstation:   [id number]      Cardiology, Vascular, and Other Imaging  No other imaging results found for the past 2 days      Diagnostic study results (if any) were reviewed by Omar Li PA-C.    Assessment/Plan   Allergies, medications, history, and pertinent labs/EKGs/Imaging reviewed by Omar Li PA-C.     Medical Decision Making  MDM:      Summary: This is a 64-year-old  female here for cold symptoms. Vital signs were reviewed. Patient is well-appearing nontoxic on exam. Differential diagnosis includes but not limited to pneumonia, bronchitis, viral URI, pyelonephritis, nephrolithiasis, UTI.  Chest x-ray does not demonstrate consolidation.  Urine dip demonstrates blood but no signs of infection.  Urine sent for culture.  Do think she has bronchitis causing left posterior rib pain with coughing.  She was prescribed Tessalon Perles.  She is to use heating pad and Tylenol for pain.  Her urine dip demonstrates blood but she is not having any urinary symptoms.  Urine was sent for culture.  Her blood pressure is 150/80.  Continue antihypertensive medication as prescribed and was referred to primary care doctor.  Stable for discharge. Follow-up with PCP. Return to urgent care or go to the emergency department if symptoms worsen or if new symptoms develop.    Orders and Diagnoses  Diagnoses and all orders for this visit:  Bronchitis  -     benzonatate (Tessalon) 200 mg capsule; Take 1 capsule (200 mg) by mouth 3 times a day as needed for cough. Do not crush or chew.  -     Referral to Primary Care; Future  Hematuria, unspecified type  -     Urine Culture  -     Referral to Primary Care; Future  Primary hypertension  -     Referral to Primary Care; Future  Cloudy urine  -     POCT UA Automated manually resulted  Acute cough  -     XR chest 2 views; Future  Acute right-sided thoracic back pain      Medical Admin Record      Patient disposition: Home    Electronically signed by Omar Li PA-C  9:59 AM           [1] (Not in a hospital admission)   [2]   Past Medical History:  Diagnosis Date    Hyperlipidemia     Hypertension     Obese     Prediabetes    [3]   Past Surgical History:  Procedure Laterality Date    BREAST SURGERY Bilateral 2002    reduction    HYSTERECTOMY  2003    THYROID SURGERY  1990    has ad 3 surgeries between 3790-0255    TOTAL HIP ARTHROPLASTY  2024

## 2025-08-05 LAB — BACTERIA UR CULT: NORMAL

## (undated) DEVICE — SHIELD, SPLASH, SOFT CONE, MEDIUM, F/STRYKER INTERPULSE SYSTEM

## (undated) DEVICE — DRESSING, MEPILEX BORDER, POST-OP AG, 4 X 10 IN

## (undated) DEVICE — PILLOW, ABDUCTION, MEDIUM

## (undated) DEVICE — INTERPULSE HANDPIECE SET W/ 10FT SUCTION TUBING

## (undated) DEVICE — CLOSURE SYSTEM, DERMABOND, PRINEO, 22CM, STERILE

## (undated) DEVICE — SUTURE, VICRYL, 0, 36 IN, CT-1, UNDYED

## (undated) DEVICE — SUTURE, VICRYL, 1, 24 IN, CTD, UNDYED

## (undated) DEVICE — CAUTERY, PENCIL, PUSH BUTTON, SMOKE EVAC, 70MM

## (undated) DEVICE — SUTURE, MONOCRYL, 4-0, 18 IN, PS2, UNDYED

## (undated) DEVICE — DRAPE, INCISE, ANTIMICROBIAL, IOBAN 2, STERI DRAPE, 23 X 33 IN, DISPOSABLE, STERILE

## (undated) DEVICE — DRAPE, INCISE, ANTIMICROBIAL, IOBAN 2, LARGE, 17 X 23 IN, DISPOSABLE, STERILE

## (undated) DEVICE — HOOD, SURGICAL, FLYTE SURGICOOL

## (undated) DEVICE — GLOVE, SURGICAL, PROTEXIS PI MICRO, 8.0, PF, LF

## (undated) DEVICE — GLOVE, SURGICAL, PROTEXIS PI W/NEU-THERA, 8.0, PF, LF

## (undated) DEVICE — DRAPE, PAD, PREP, W/ 9 IN CUFF, 24 X 41, LF, NS

## (undated) DEVICE — HIGH FLOW TIP FOR INTERPULSE HANDPIECE SET

## (undated) DEVICE — Device

## (undated) DEVICE — SUTURE, CTD, VICRYL, 2-0, UND, BR, CT-2

## (undated) DEVICE — BLADE, SAW, SAGITTAL, 25 X 74 X 0.89 MM, STERILE